# Patient Record
Sex: FEMALE | Race: WHITE | Employment: OTHER | URBAN - METROPOLITAN AREA
[De-identification: names, ages, dates, MRNs, and addresses within clinical notes are randomized per-mention and may not be internally consistent; named-entity substitution may affect disease eponyms.]

---

## 2017-01-20 DIAGNOSIS — E11.9 DIABETES MELLITUS, TYPE 2 (H): Primary | ICD-10-CM

## 2017-04-21 DIAGNOSIS — E11.9 TYPE 2 DIABETES MELLITUS WITHOUT COMPLICATION (H): ICD-10-CM

## 2017-04-21 DIAGNOSIS — F33.1 MAJOR DEPRESSIVE DISORDER, RECURRENT EPISODE, MODERATE (H): ICD-10-CM

## 2017-04-24 RX ORDER — GLIPIZIDE 5 MG/1
TABLET, FILM COATED, EXTENDED RELEASE ORAL
Qty: 30 TABLET | Refills: 0 | Status: SHIPPED | OUTPATIENT
Start: 2017-04-24 | End: 2017-05-08

## 2017-05-04 ENCOUNTER — OFFICE VISIT (OUTPATIENT)
Dept: FAMILY MEDICINE | Facility: OTHER | Age: 67
End: 2017-05-04
Attending: FAMILY MEDICINE
Payer: COMMERCIAL

## 2017-05-04 VITALS
WEIGHT: 185 LBS | BODY MASS INDEX: 31.76 KG/M2 | RESPIRATION RATE: 14 BRPM | SYSTOLIC BLOOD PRESSURE: 100 MMHG | HEART RATE: 80 BPM | DIASTOLIC BLOOD PRESSURE: 70 MMHG

## 2017-05-04 DIAGNOSIS — I10 ESSENTIAL HYPERTENSION: ICD-10-CM

## 2017-05-04 DIAGNOSIS — D64.9 ANEMIA, UNSPECIFIED TYPE: ICD-10-CM

## 2017-05-04 DIAGNOSIS — D33.3 BENIGN NEOPLASM OF CRANIAL NERVE (H): ICD-10-CM

## 2017-05-04 DIAGNOSIS — Z11.59 NEED FOR HEPATITIS C SCREENING TEST: ICD-10-CM

## 2017-05-04 DIAGNOSIS — E11.9 TYPE 2 DIABETES MELLITUS WITHOUT COMPLICATION, WITHOUT LONG-TERM CURRENT USE OF INSULIN (H): Primary | ICD-10-CM

## 2017-05-04 DIAGNOSIS — M54.41 CHRONIC BILATERAL LOW BACK PAIN WITH RIGHT-SIDED SCIATICA: ICD-10-CM

## 2017-05-04 DIAGNOSIS — M25.562 CHRONIC PAIN OF LEFT KNEE: ICD-10-CM

## 2017-05-04 DIAGNOSIS — G89.29 CHRONIC BILATERAL LOW BACK PAIN WITH RIGHT-SIDED SCIATICA: ICD-10-CM

## 2017-05-04 DIAGNOSIS — R22.1 MASS OF RIGHT SIDE OF NECK: ICD-10-CM

## 2017-05-04 DIAGNOSIS — G89.29 CHRONIC PAIN OF LEFT KNEE: ICD-10-CM

## 2017-05-04 LAB
ERYTHROCYTE [DISTWIDTH] IN BLOOD BY AUTOMATED COUNT: 13.2 % (ref 10–15)
HCT VFR BLD AUTO: 44.2 % (ref 35–47)
HGB BLD-MCNC: 14.9 G/DL (ref 11.7–15.7)
MCH RBC QN AUTO: 28.6 PG (ref 26.5–33)
MCHC RBC AUTO-ENTMCNC: 33.7 G/DL (ref 31.5–36.5)
MCV RBC AUTO: 85 FL (ref 78–100)
PLATELET # BLD AUTO: 244 10E9/L (ref 150–450)
RBC # BLD AUTO: 5.21 10E12/L (ref 3.8–5.2)
WBC # BLD AUTO: 9.5 10E9/L (ref 4–11)

## 2017-05-04 PROCEDURE — 84443 ASSAY THYROID STIM HORMONE: CPT | Mod: ZL | Performed by: FAMILY MEDICINE

## 2017-05-04 PROCEDURE — 99000 SPECIMEN HANDLING OFFICE-LAB: CPT | Performed by: FAMILY MEDICINE

## 2017-05-04 PROCEDURE — 83036 HEMOGLOBIN GLYCOSYLATED A1C: CPT | Mod: ZL | Performed by: FAMILY MEDICINE

## 2017-05-04 PROCEDURE — 99215 OFFICE O/P EST HI 40 MIN: CPT | Performed by: FAMILY MEDICINE

## 2017-05-04 PROCEDURE — 80061 LIPID PANEL: CPT | Mod: ZL | Performed by: FAMILY MEDICINE

## 2017-05-04 PROCEDURE — 86803 HEPATITIS C AB TEST: CPT | Mod: ZL | Performed by: FAMILY MEDICINE

## 2017-05-04 PROCEDURE — 36415 COLL VENOUS BLD VENIPUNCTURE: CPT | Mod: ZL | Performed by: FAMILY MEDICINE

## 2017-05-04 PROCEDURE — 85027 COMPLETE CBC AUTOMATED: CPT | Mod: ZL | Performed by: FAMILY MEDICINE

## 2017-05-04 PROCEDURE — 40000788 ZZHCL STATISTIC ESTIMATED AVERAGE GLUCOSE: Mod: ZL | Performed by: FAMILY MEDICINE

## 2017-05-04 PROCEDURE — 80048 BASIC METABOLIC PNL TOTAL CA: CPT | Mod: ZL | Performed by: FAMILY MEDICINE

## 2017-05-04 PROCEDURE — 99212 OFFICE O/P EST SF 10 MIN: CPT

## 2017-05-04 RX ORDER — CYCLOSPORINE 0.5 MG/ML
1 EMULSION OPHTHALMIC 2 TIMES DAILY
COMMUNITY
End: 2020-01-01

## 2017-05-04 NOTE — PROGRESS NOTES
SUBJECTIVE:                                                    Nilda Ramirez is a 66 year old female who presents to clinic today for the following health issues:      Diabetes Follow-up      Patient is checking blood sugars: rarely.     Diabetic concerns: None     Symptoms of hypoglycemia (low blood sugar): shaky, dizzy     Paresthesias (numbness or burning in feet) or sores: No     Date of last diabetic eye exam: 1/2015     Hypertension Follow-up      Outpatient blood pressures are not being checked.    Low Salt Diet: no added salt     Back Pain Follow Up       Description:   Location of pain:  bilateral  Character of pain: dull ache, stabbing and intermittent  Pain radiation: radiates into the right buttocks  Since last visit, pain is:  unchanged  New numbness or weakness in legs, not attributed to pain:  no     Intensity: moderate    History:   Pain interferes with job: Not applicable  Therapies tried without relief: medications and therapy  Therapies tried with relief: none        Accompanying Signs & Symptoms:  Risk of Fracture:  Age >64  Risk of Cauda Equina:  None  Risk of Infection:  None  Risk of Cancer:  None    Patient states she was to be [facility] to [facility] after her last appointment and nothing happened.  She wonders if repeat MRI is needed.     Patient does have a history of resection of a brain tumor.  Her last MRI was in 2015.  Regular follow-up was recommended.    Patient states she has found a mass along the right jaw line.  She states if has been present for a few weeks.  There are some days where she can't feel it.  She is concerned about a malignant process.    Patient states she has had both knees replaced, but her left knee has been bothering her, which is causing pain in the left hip area as well.  She wonders if she can have MRI of both areas, as we are already ordering MRI of brain and lumbar spine.    Patient is well overdue for many screening issues, and does agree to  come back in a couple of weeks to address further concerns.      Problem list and histories reviewed & adjusted, as indicated.  Additional history: as documented    Patient Active Problem List   Diagnosis     Advanced care planning/counseling discussion     Diabetes mellitus, type 2 (H)     Benign neoplasm of cranial nerve (H)     Schwannoma of cranial nerve (H)     Closed fracture of cervical vertebra (H)     Anemia     Anxiety state     Arthropathy     History of pulmonary embolism     Major depressive disorder, recurrent episode (H)     Esophageal reflux     Essential hypertension     History of disease of skin and subcutaneous tissue     Insomnia     Past Surgical History:   Procedure Laterality Date     ARTHROSCOPY KNEE  1996    bilat total knees     BACK SURGERY  2006     BIOPSY BREAST  2009     BIOPSY BREAST  2010     BRAIN TUMOR RESECTION  2000    Acoustic neuroma/schwanoma     COLONOSCOPY  2007    repeat 10 yrs     D&C  1990     deafness in left ear       knee replacement  2/2013    right     Left total knee replacement  11/19/2012    left - Degenerative disc disease     Multi-level cervical spine fusion      Cervical spine multi-level fracture     Right knee cortisone injection  11/26/2012     total knee arthroplasty  2/15/2013    right - knee arthrosis, severe valgus       Social History   Substance Use Topics     Smoking status: Never Smoker     Smokeless tobacco: Never Used      Comment: no passive smoke exposure     Alcohol use Yes      Comment: beer & wine rarely     Family History   Problem Relation Age of Onset     CEREBROVASCULAR DISEASE Paternal Grandmother      CVA     HEART DISEASE Father      heart disease     Coronary Artery Disease Father      Thyroid Disease No family hx of      Asthma No family hx of          Current Outpatient Prescriptions   Medication Sig Dispense Refill     cycloSPORINE (RESTASIS) 0.05 % ophthalmic emulsion Place 1 drop into both eyes 2 times daily       sertraline  "(ZOLOFT) 50 MG tablet TAKE 1 TABLET (50 MG) BY MOUTH DAILY 30 tablet 0     glipiZIDE (GLUCOTROL XL) 5 MG 24 hr tablet TAKE 1 TABLET (5 MG) BY MOUTH DAILY 30 tablet 0     ANDERSON CONTOUR NEXT test strip USE TO TEST BLOOD SUGAR ONCE DAILY OR AS DIRECTED. 50 each 0     ALLERGY RELIEF 10 MG tablet TAKE 1 TABLET (10 MG) BY MOUTH DAILY AS NEEDED 30 tablet 0     EASY TOUCH LANCETS 32G/TWIST MISC AS DIRECTED 100 each 2     traZODone (DESYREL) 100 MG tablet Take 1 tablet (100 mg) by mouth At Bedtime 90 tablet 3     omeprazole (PRILOSEC) 20 MG capsule Take 1 capsule (20 mg) by mouth daily as needed 90 capsule 3     hydrochlorothiazide (HYDRODIURIL) 25 MG tablet Take 1 tablet (25 mg) by mouth daily as needed 90 tablet 3     oxyCODONE-acetaminophen (PERCOCET) 5-325 MG per tablet Take 1 tablet by mouth nightly as needed for moderate to severe pain 30 tablet 0     loratadine (CLARITIN) 10 MG tablet Take 1 tablet (10 mg) by mouth daily as needed 90 tablet 3     ASPIRIN EC PO Take 162 mg by mouth daily       acetaminophen (TYLENOL) 325 MG tablet Take 1-2 tablets by mouth every 4 hours as needed.       Allergies   Allergen Reactions     Hydrocodone Bitartrate      Meperidine Hcl      Morphine Sulfate        ROS:  Constitutional, HEENT, cardiovascular, pulmonary, GI, , musculoskeletal, neuro, skin, endocrine and psych systems are negative, except as otherwise noted.  Patient does note that her \"gallstone\" is giving her trouble, and states she will need her gallbladder removed at some point here.    OBJECTIVE:                                                    /70 (BP Location: Left arm, Patient Position: Chair, Cuff Size: Adult Large)  Pulse 80  Resp 14  Wt 185 lb (83.9 kg)  BMI 31.76 kg/m2  Body mass index is 31.76 kg/(m^2).  GENERAL: healthy, alert and no distress  Neck: soft mass, likely lymph node, along right jaw line  PSYCH: mentation appears normal, affect normal/bright    Diagnostic Test Results:  none  "     ASSESSMENT/PLAN:                                                    Diabetes Type II, A1c Controlled, non-insulin dependent   Associated with the following complications    Renal Complications:  None    Ophthalmologic Complications: None    Neurologic Complications: None    Peripheral Vascular Complications:  None    Other: None   Plan:  No changes in the patient's current treatment plan  Labs:  See orders    Hypertension; controlled   Associated with the following complications:    Diabetes   Plan:  Labs:   See orders            ICD-10-CM    1. Type 2 diabetes mellitus without complication, without long-term current use of insulin (H) E11.9 Hemoglobin A1c     Lipid Profile     TSH     Albumin Random Urine Quantitative     CANCELED: Albumin Random Urine Quantitative   2. Essential hypertension I10 Basic metabolic panel     Albumin Random Urine Quantitative   3. Anemia, unspecified type D64.9 CBC with platelets   4. Benign neoplasm of cranial nerve (H) D33.3 MR Brain w/o & w Contrast     MR Brain w/o & w Contrast   5. Chronic bilateral low back pain with right-sided sciatica M54.41 MR Lumbar Spine w/o Contrast    G89.29 MR Lumbar Spine w/o Contrast   6. Chronic pain of left knee M25.562 ORTHOPEDICS ADULT REFERRAL    G89.29     previous replacement   7. Mass of right side of neck R22.1 US Head Neck Soft Tissue     US Head Neck Soft Tissue   8. Need for hepatitis C screening test Z11.59 Hepatitis C Screen Reflex to HCV RNA Quant and Genotype       FUTURE APPOINTMENTS:       - Follow-up visit in 2 weeks to review results and address further concerns.    Over 50 minutes are spent with the patient, over 50% of which was in education and counseling regarding current conditions and treatment/therapy options/risks/benefits/etc.      Jennifer Bowling MD  [facility]

## 2017-05-04 NOTE — MR AVS SNAPSHOT
After Visit Summary   5/4/2017    Nilda Ramirez    MRN: [id number]           Patient Information     Date Of Birth          1950        Visit Information        Provider Department      5/4/2017 2:00 PM Jennifer Bowling MD [facility]        Today's Diagnoses     Type 2 diabetes mellitus without complication, without long-term current use of insulin (H)    -  1    Essential hypertension        Anemia, unspecified type        Benign neoplasm of cranial nerve (H)        Chronic bilateral low back pain with right-sided sciatica        Chronic pain of left knee        Mass of right side of neck        Need for hepatitis C screening test           Follow-ups after your visit        Additional Services     ORTHOPEDICS ADULT REFERRAL       Your provider has referred you to: Dr. Garcia locally    Please be aware that coverage of these services is subject to the terms and limitations of your health insurance plan.  Call member services at your health plan with any benefit or coverage questions.      Please bring the following to your appointment:    >>   Any x-rays, CTs or MRIs which have been performed.  Contact the facility where they were done to arrange for  prior to your scheduled appointment.    >>   List of current medications   >>   This referral request   >>   Any documents/labs given to you for this referral                  Follow-up notes from your care team     Return in about 2 weeks (around 5/18/2017).      Your next 10 appointments already scheduled     May 12, 2017 10:15 AM CDT   (Arrive by 10:00 AM)   New Visit with Oliver Garcia MD   [facility] ([facility] )    [address]  [facility] [address]   [phone]            May 16, 2017  2:00 PM CDT   Radiology with HI MRI   [facility] ([facility] )    [address] [place] [address] [id number] [phone]            May 16, 2017  3:30 PM CDT   Radiology  "with HI UNTRASOUND 1   HI Ultrasound ([facility] )    [address] [facility] [address]   [phone]            May 23, 2017  2:00 PM CDT   (Arrive by 1:45 PM)   SHORT with Jennifer Bowling MD   [facility] ([facility] )    [address]  [place] [address]   [phone]              Future tests that were ordered for you today     Open Future Orders        Priority Expected Expires Ordered    Albumin Random Urine Quantitative Routine  2018            Who to contact     If you have questions or need follow up information about today's clinic visit or your schedule please contact [facility] directly at [phone].  Normal or non-critical lab and imaging results will be communicated to you by SoothEasehart, letter or phone within 4 business days after the clinic has received the results. If you do not hear from us within 7 days, please contact the clinic through SoothEasehart or phone. If you have a critical or abnormal lab result, we will notify you by phone as soon as possible.  Submit refill requests through [employer] or call your pharmacy and they will forward the refill request to us. Please allow 3 business days for your refill to be completed.          Additional Information About Your Visit        [employer] Information     [employer] lets you send messages to your doctor, view your test results, renew your prescriptions, schedule appointments and more. To sign up, go to [email or web address] . Click on \"Log in\" on the left side of the screen, which will take you to the Welcome page. Then click on \"Sign up Now\" on the right side of the page.     You will be asked to enter the access code listed below, as well as some personal information. Please follow the directions to create your username and password.     Your access code is: [email or web address]  Expires: 2017  5:45 PM     Your access code will  in 90 days. If you need help or a new " code, please call your [facility] clinic or [phone].        Care EveryWhere ID     This is your Care EveryWhere ID. This could be used by other organizations to access your [facility] medical records  [id number]        Your Vitals Were     Pulse Respirations BMI (Body Mass Index)             80 14 31.76 kg/m2          Blood Pressure from Last 3 Encounters:   05/04/17 100/70   10/09/15 100/64   02/10/15 120/76    Weight from Last 3 Encounters:   05/04/17 185 lb (83.9 kg)   10/09/15 152 lb (68.9 kg)   02/10/15 163 lb (73.9 kg)              We Performed the Following     Basic metabolic panel     CBC with platelets     Hemoglobin A1c     Hepatitis C Screen Reflex to HCV RNA Quant and Genotype     Lipid Profile     MR Brain w/o & w Contrast     MR Lumbar Spine w/o Contrast     ORTHOPEDICS ADULT REFERRAL     [facility] Head Neck Soft Tissue        Primary Care Provider Office Phone # Fax #    Jennifer Bowling [phone]       [address] [address]        Thank you!     Thank you for choosing [facility]  for your care. Our goal is always to provide you with excellent care. Hearing back from our patients is one way we can continue to improve our services. Please take a few minutes to complete the written survey that you may receive in the mail after your visit with us. Thank you!             Your Updated Medication List - Protect others around you: Learn how to safely use, store and throw away your medicines at www.disposemymeds.org.          This list is accurate as of: 5/4/17  5:45 PM.  Always use your most recent med list.                   Brand Name Dispense Instructions for use    ASPIRIN EC PO      Take 162 mg by mouth daily       ANDERSON CONTOUR NEXT test strip   Generic drug:  blood glucose monitoring     50 each    USE TO TEST BLOOD SUGAR ONCE DAILY OR AS DIRECTED.       EASY TOUCH LANCETS 32G/TWIST Misc     100 each    AS  DIRECTED       glipiZIDE 5 MG 24 hr tablet    GLUCOTROL XL    30 tablet    TAKE 1 TABLET (5 MG) BY MOUTH DAILY       hydrochlorothiazide 25 MG tablet    HYDRODIURIL    90 tablet    Take 1 tablet (25 mg) by mouth daily as needed       * loratadine 10 MG tablet    CLARITIN    90 tablet    Take 1 tablet (10 mg) by mouth daily as needed       * ALLERGY RELIEF 10 MG tablet   Generic drug:  loratadine     30 tablet    TAKE 1 TABLET (10 MG) BY MOUTH DAILY AS NEEDED       omeprazole 20 MG CR capsule    priLOSEC    90 capsule    Take 1 capsule (20 mg) by mouth daily as needed       oxyCODONE-acetaminophen 5-325 MG per tablet    PERCOCET    30 tablet    Take 1 tablet by mouth nightly as needed for moderate to severe pain       RESTASIS 0.05 % ophthalmic emulsion   Generic drug:  cycloSPORINE      Place 1 drop into both eyes 2 times daily       sertraline 50 MG tablet    ZOLOFT    30 tablet    TAKE 1 TABLET (50 MG) BY MOUTH DAILY       traZODone 100 MG tablet    DESYREL    90 tablet    Take 1 tablet (100 mg) by mouth At Bedtime       TYLENOL 325 MG tablet   Generic drug:  acetaminophen      Take 1-2 tablets by mouth every 4 hours as needed.       * Notice:  This list has 2 medication(s) that are the same as other medications prescribed for you. Read the directions carefully, and ask your doctor or other care provider to review them with you.

## 2017-05-04 NOTE — NURSING NOTE
"Chief Complaint   Patient presents with     Chronic Disease Management     last seen , gained 35lbs over the last yr     Musculoskeletal Problem     bilat total knees, now left is hurting     Mass     right side of neck     Urinary Problem     leakage, would like rx for pads       Initial /70 (BP Location: Left arm, Patient Position: Chair, Cuff Size: Adult Large)  Pulse 80  Resp 14  Wt 185 lb (83.9 kg)  BMI 31.76 kg/m2 Estimated body mass index is 31.76 kg/(m^2) as calculated from the following:    Height as of 10/9/15: 5' 4\" (1.626 m).    Weight as of this encounter: 185 lb (83.9 kg).  Medication Reconciliation: complete     Melisa Kumar      "

## 2017-05-05 ENCOUNTER — TELEPHONE (OUTPATIENT)
Dept: FAMILY MEDICINE | Facility: OTHER | Age: 67
End: 2017-05-05

## 2017-05-05 DIAGNOSIS — I10 ESSENTIAL HYPERTENSION: ICD-10-CM

## 2017-05-05 DIAGNOSIS — E11.9 TYPE 2 DIABETES MELLITUS WITHOUT COMPLICATION, WITHOUT LONG-TERM CURRENT USE OF INSULIN (H): ICD-10-CM

## 2017-05-05 DIAGNOSIS — R32 URINARY INCONTINENCE, UNSPECIFIED TYPE: ICD-10-CM

## 2017-05-05 DIAGNOSIS — F33.1 MAJOR DEPRESSIVE DISORDER, RECURRENT EPISODE, MODERATE (H): ICD-10-CM

## 2017-05-05 DIAGNOSIS — E11.9 TYPE 2 DIABETES MELLITUS WITHOUT COMPLICATION, WITHOUT LONG-TERM CURRENT USE OF INSULIN (H): Primary | ICD-10-CM

## 2017-05-05 LAB
ANION GAP SERPL CALCULATED.3IONS-SCNC: 15 MMOL/L (ref 3–14)
BUN SERPL-MCNC: 15 MG/DL (ref 7–30)
CALCIUM SERPL-MCNC: 9.3 MG/DL (ref 8.5–10.1)
CHLORIDE SERPL-SCNC: 102 MMOL/L (ref 94–109)
CHOLEST SERPL-MCNC: 157 MG/DL
CO2 SERPL-SCNC: 25 MMOL/L (ref 20–32)
CREAT SERPL-MCNC: 1.14 MG/DL (ref 0.52–1.04)
EST. AVERAGE GLUCOSE BLD GHB EST-MCNC: 134 MG/DL
GFR SERPL CREATININE-BSD FRML MDRD: 47 ML/MIN/1.7M2
GLUCOSE SERPL-MCNC: 129 MG/DL (ref 70–99)
HBA1C MFR BLD: 6.3 % (ref 4.3–6)
HDLC SERPL-MCNC: 54 MG/DL
LDLC SERPL CALC-MCNC: 83 MG/DL
NONHDLC SERPL-MCNC: 103 MG/DL
POTASSIUM SERPL-SCNC: 4 MMOL/L (ref 3.4–5.3)
SODIUM SERPL-SCNC: 142 MMOL/L (ref 133–144)
TRIGL SERPL-MCNC: 102 MG/DL
TSH SERPL DL<=0.05 MIU/L-ACNC: 1.72 MU/L (ref 0.4–4)

## 2017-05-05 PROCEDURE — 82043 UR ALBUMIN QUANTITATIVE: CPT | Mod: ZL | Performed by: FAMILY MEDICINE

## 2017-05-05 ASSESSMENT — PATIENT HEALTH QUESTIONNAIRE - PHQ9: SUM OF ALL RESPONSES TO PHQ QUESTIONS 1-9: 6

## 2017-05-05 NOTE — TELEPHONE ENCOUNTER
4:43 PM    Reason for Call: Phone Call    Description: NEED FAX SENT ON MEDS & INCONTINENCE SUPPLIES ORDERED    Was an appointment offered for this call? N/A    Preferred method for responding to this message: Telephone Call    If we cannot reach you directly, may we leave a detailed response at the number you provided? Yes    Can this message wait until your PCP/provider returns, if available today? Not applicable, PROVIDER IN TODAY    Rosalinda Best

## 2017-05-08 DIAGNOSIS — E11.9 DIABETES MELLITUS, TYPE 2 (H): ICD-10-CM

## 2017-05-08 LAB
CREAT UR-MCNC: 249 MG/DL
HCV AB SERPL QL IA: NORMAL
MICROALBUMIN UR-MCNC: 9 MG/L
MICROALBUMIN/CREAT UR: 3.78 MG/G CR (ref 0–25)

## 2017-05-08 RX ORDER — GLIPIZIDE 5 MG/1
5 TABLET, FILM COATED, EXTENDED RELEASE ORAL DAILY
Qty: 90 TABLET | Refills: 3 | Status: SHIPPED | OUTPATIENT
Start: 2017-05-08 | End: 2018-02-21

## 2017-05-08 RX ORDER — HYDROCHLOROTHIAZIDE 25 MG/1
25 TABLET ORAL DAILY PRN
Qty: 90 TABLET | Refills: 3 | Status: SHIPPED | OUTPATIENT
Start: 2017-05-08 | End: 2019-07-09

## 2017-05-08 NOTE — TELEPHONE ENCOUNTER
Glipizide, HCTZ, and sertraline sent to [facility] (looks like those are the main active prescriptions).  Script printed for incontinence supplies.

## 2017-05-08 NOTE — TELEPHONE ENCOUNTER
HAS INCONTINENT CONCERN ABOUT 3 PAIR DAILY AND WOULD LIKE TO A CTIVE STYLE SHE WILL CONTACT THEM TO SEND US THE FAX AND THEN SHE STATES SHE HAS NOT RECEIVED ANY OF HER MEDICATION REFILLS FROM APPT ON Thursday TO [facility] AND THEY ARE STILL WAITING

## 2017-05-10 NOTE — TELEPHONE ENCOUNTER
Contour Test Strips         Last Written Prescription Date: 1/31/2017  Last Fill Quantity: 50, # refills: 0  Last Office Visit with [facility], [facility] or  Health prescribing provider:  05/04/2017   Next 5 appointments (look out 90 days)     May 23, 2017  2:00 PM CDT   (Arrive by 1:45 PM)   SHORT with Jennifer Bowling MD   [facility] ([facility] )    [address]  [facility] [address]   [phone]                   BP Readings from Last 3 Encounters:   05/04/17 100/70   10/09/15 100/64   02/10/15 120/76     Lab Results   Component Value Date    MICROL 9 05/05/2017     Lab Results   Component Value Date    UMALCR 3.78 05/05/2017     Creatinine   Date Value Ref Range Status   05/04/2017 1.14 (H) 0.52 - 1.04 mg/dL Final   ]  GFR Estimate   Date Value Ref Range Status   05/04/2017 47 (L) >60 mL/min/1.7m2 Final     Comment:     Non  GFR Calc   10/09/2015 57 (L) >60 mL/min/1.7m2 Final     Comment:     Non  GFR Calc   12/29/2014 54 (L) >60 mL/min/1.7m2 Final     Comment:     Non  GFR Calc     GFR Estimate If Black   Date Value Ref Range Status   05/04/2017 57 (L) >60 mL/min/1.7m2 Final     Comment:      GFR Calc   10/09/2015 69 >60 mL/min/1.7m2 Final     Comment:      GFR Calc   12/29/2014 65 >60 mL/min/1.7m2 Final     Comment:      GFR Calc     Lab Results   Component Value Date    CHOL 157 05/04/2017     Lab Results   Component Value Date    HDL 54 05/04/2017     Lab Results   Component Value Date    LDL 83 05/04/2017     Lab Results   Component Value Date    TRIG 102 05/04/2017     Lab Results   Component Value Date    CHOLHDLRATIO 2.4 10/09/2015     No results found for: AST  No results found for: ALT  Lab Results   Component Value Date    A1C 6.3 05/04/2017    A1C 5.7 10/09/2015    A1C 5.6 12/29/2014    A1C 7.5 05/04/2012     Potassium   Date Value Ref Range Status   05/04/2017 4.0 3.4 - 5.3  mmol/L Final

## 2017-05-12 ENCOUNTER — OFFICE VISIT (OUTPATIENT)
Dept: CARE COORDINATION | Facility: OTHER | Age: 67
End: 2017-05-12
Attending: FAMILY MEDICINE
Payer: COMMERCIAL

## 2017-05-12 DIAGNOSIS — M25.511 CHRONIC RIGHT SHOULDER PAIN: ICD-10-CM

## 2017-05-12 DIAGNOSIS — G89.29 CHRONIC RIGHT SHOULDER PAIN: ICD-10-CM

## 2017-05-12 DIAGNOSIS — M25.562 LEFT KNEE PAIN: Primary | ICD-10-CM

## 2017-05-12 DIAGNOSIS — M25.552 HIP PAIN, LEFT: ICD-10-CM

## 2017-05-12 PROCEDURE — 73030 X-RAY EXAM OF SHOULDER: CPT | Mod: TC,RT

## 2017-05-12 PROCEDURE — 73502 X-RAY EXAM HIP UNI 2-3 VIEWS: CPT | Mod: TC,LT

## 2017-05-16 ENCOUNTER — TELEPHONE (OUTPATIENT)
Dept: MRI IMAGING | Facility: HOSPITAL | Age: 67
End: 2017-05-16

## 2017-05-17 DIAGNOSIS — J30.9 ALLERGIC RHINITIS: ICD-10-CM

## 2017-05-17 DIAGNOSIS — F33.9 MAJOR DEPRESSIVE DISORDER, RECURRENT EPISODE (H): ICD-10-CM

## 2017-05-18 DIAGNOSIS — G47.00 INSOMNIA: ICD-10-CM

## 2017-05-18 NOTE — TELEPHONE ENCOUNTER
trazadone      Last Written Prescription Date: 11/3/16  Last Fill Quantity: 90,  # refills: 3   Last Office Visit with [facility] or  Health prescribing provider: 5/4/17                                         Next 5 appointments (look out 90 days)     May 23, 2017  2:00 PM CDT   (Arrive by 1:45 PM)   SHORT with Jennifer Bowling MD   [facility] ([facility] )    [address]  [place] [address]   [phone]               claritin      Last Written Prescription Date: 2/15/13  Last Fill Quantity: 90,  # refills: 3   Last Office Visit with [facility] or [facility] prescribing provider: 5/4/17                                         Next 5 appointments (look out 90 days)     May 23, 2017  2:00 PM CDT   (Arrive by 1:45 PM)   SHORT with Jennifer Bowling MD   [facility] ([facility] )    [address]  [place] [address]   [phone]

## 2017-05-19 RX ORDER — TRAZODONE HYDROCHLORIDE 100 MG/1
TABLET ORAL
Qty: 90 TABLET | Refills: 1 | Status: SHIPPED | OUTPATIENT
Start: 2017-05-19 | End: 2017-06-20

## 2017-05-19 RX ORDER — LORATADINE 10 MG/1
TABLET ORAL
Qty: 90 TABLET | Refills: 3 | Status: SHIPPED | OUTPATIENT
Start: 2017-05-19 | End: 2018-05-21

## 2017-05-19 RX ORDER — TRAZODONE HYDROCHLORIDE 100 MG/1
TABLET ORAL
Qty: 90 TABLET | Refills: 1 | Status: SHIPPED | OUTPATIENT
Start: 2017-05-19 | End: 2017-10-20

## 2017-05-23 ENCOUNTER — OFFICE VISIT (OUTPATIENT)
Dept: FAMILY MEDICINE | Facility: OTHER | Age: 67
End: 2017-05-23
Attending: FAMILY MEDICINE
Payer: COMMERCIAL

## 2017-05-23 VITALS
OXYGEN SATURATION: 98 % | DIASTOLIC BLOOD PRESSURE: 86 MMHG | TEMPERATURE: 97.2 F | WEIGHT: 177 LBS | HEART RATE: 81 BPM | SYSTOLIC BLOOD PRESSURE: 122 MMHG | HEIGHT: 65 IN | BODY MASS INDEX: 29.49 KG/M2 | RESPIRATION RATE: 15 BRPM

## 2017-05-23 DIAGNOSIS — R10.11 RUQ ABDOMINAL PAIN: Primary | ICD-10-CM

## 2017-05-23 DIAGNOSIS — M54.41 CHRONIC BILATERAL LOW BACK PAIN WITH RIGHT-SIDED SCIATICA: ICD-10-CM

## 2017-05-23 DIAGNOSIS — G89.29 CHRONIC BILATERAL LOW BACK PAIN WITH RIGHT-SIDED SCIATICA: ICD-10-CM

## 2017-05-23 PROCEDURE — 99214 OFFICE O/P EST MOD 30 MIN: CPT | Performed by: FAMILY MEDICINE

## 2017-05-23 PROCEDURE — 99212 OFFICE O/P EST SF 10 MIN: CPT

## 2017-05-23 NOTE — MR AVS SNAPSHOT
"              After Visit Summary   2017    Nilda Ramirez    MRN: [id number]           Patient Information     Date Of Birth          1950        Visit Information        Provider Department      2017 2:00 PM Jennifer Bowling MD [facility]        Today's Diagnoses     RUQ abdominal pain    -  1    Chronic bilateral low back pain with right-sided sciatica           Follow-ups after your visit        Follow-up notes from your care team     Return if symptoms worsen or fail to improve.      Who to contact     If you have questions or need follow up information about today's clinic visit or your schedule please contact [facility] directly at [phone].  Normal or non-critical lab and imaging results will be communicated to you by MyChart, letter or phone within 4 business days after the clinic has received the results. If you do not hear from us within 7 days, please contact the clinic through MyChart or phone. If you have a critical or abnormal lab result, we will notify you by phone as soon as possible.  Submit refill requests through [employer] or call your pharmacy and they will forward the refill request to us. Please allow 3 business days for your refill to be completed.          Additional Information About Your Visit        MyChart Information     [employer] lets you send messages to your doctor, view your test results, renew your prescriptions, schedule appointments and more. To sign up, go to [email or web address] . Click on \"Log in\" on the left side of the screen, which will take you to the Welcome page. Then click on \"Sign up Now\" on the right side of the page.     You will be asked to enter the access code listed below, as well as some personal information. Please follow the directions to create your username and password.     Your access code is: [email or web address]  Expires: 2017  5:45 PM     Your access code will  in 90 days. If you need help or a new " "code, please call your [facility] or [phone].        Care EveryWhere ID     This is your Care EveryWhere ID. This could be used by other organizations to access your [facility] medical records  [id number]        Your Vitals Were     Pulse Temperature Respirations Height Pulse Oximetry BMI (Body Mass Index)    81 97.2  F (36.2  C) (Tympanic) 15 5' 4.5\" (1.638 m) 98% 29.91 kg/m2       Blood Pressure from Last 3 Encounters:   05/23/17 122/86   05/04/17 100/70   10/09/15 100/64    Weight from Last 3 Encounters:   05/23/17 177 lb (80.3 kg)   05/04/17 185 lb (83.9 kg)   10/09/15 152 lb (68.9 kg)              Today, you had the following     No orders found for display         Today's Medication Changes          These changes are accurate as of: 5/23/17 11:59 PM.  If you have any questions, ask your nurse or doctor.               These medicines have changed or have updated prescriptions.        Dose/Directions    ALLERGY RELIEF 10 MG tablet   This may have changed:  Another medication with the same name was removed. Continue taking this medication, and follow the directions you see here.   Used for:  Allergic rhinitis   Generic drug:  loratadine   Changed by:  Jennifer Bowling MD        TAKE 1 TABLET (10 MG) BY MOUTH DAILY AS NEEDED   Quantity:  90 tablet   Refills:  3                Primary Care Provider Office Phone # Fax #    Jennifer Bowling [phone]       [address] [address]        Thank you!     Thank you for choosing [facility]  for your care. Our goal is always to provide you with excellent care. Hearing back from our patients is one way we can continue to improve our services. Please take a few minutes to complete the written survey that you may receive in the mail after your visit with us. Thank you!             Your Updated Medication List - Protect others around you: Learn how to safely use, store and throw " away your medicines at www.disposemymeds.org.          This list is accurate as of: 5/23/17 11:59 PM.  Always use your most recent med list.                   Brand Name Dispense Instructions for use    ALLERGY RELIEF 10 MG tablet   Generic drug:  loratadine     90 tablet    TAKE 1 TABLET (10 MG) BY MOUTH DAILY AS NEEDED       ASPIRIN EC PO      Take 162 mg by mouth daily       ANDERSON CONTOUR NEXT test strip   Generic drug:  blood glucose monitoring     50 each    USE TO TEST BLOOD SUGAR ONCE DAILY OR AS DIRECTED.       EASY TOUCH LANCETS 32G/TWIST Misc     100 each    AS DIRECTED       glipiZIDE 5 MG 24 hr tablet    GLUCOTROL XL    90 tablet    Take 1 tablet (5 mg) by mouth daily       hydrochlorothiazide 25 MG tablet    HYDRODIURIL    90 tablet    Take 1 tablet (25 mg) by mouth daily as needed       omeprazole 20 MG CR capsule    priLOSEC    90 capsule    Take 1 capsule (20 mg) by mouth daily as needed       order for DME     100 Units    Incontinence pads, active style, up to 3 daily       oxyCODONE-acetaminophen 5-325 MG per tablet    PERCOCET    30 tablet    Take 1 tablet by mouth nightly as needed for moderate to severe pain       RESTASIS 0.05 % ophthalmic emulsion   Generic drug:  cycloSPORINE      Place 1 drop into both eyes 2 times daily       sertraline 50 MG tablet    ZOLOFT    90 tablet    Take 1 tablet (50 mg) by mouth daily       * traZODone 100 MG tablet    DESYREL    90 tablet    TAKE 1 TABLET (100 MG) BY MOUTH AT BEDTIME       * traZODone 100 MG tablet    DESYREL    90 tablet    TAKE 1 TABLET (100 MG) BY MOUTH AT BEDTIME       TYLENOL 325 MG tablet   Generic drug:  acetaminophen      Take 1-2 tablets by mouth every 4 hours as needed.       * Notice:  This list has 2 medication(s) that are the same as other medications prescribed for you. Read the directions carefully, and ask your doctor or other care provider to review them with you.

## 2017-05-26 ENCOUNTER — HOSPITAL ENCOUNTER (OUTPATIENT)
Dept: MRI IMAGING | Facility: HOSPITAL | Age: 67
End: 2017-05-26
Attending: FAMILY MEDICINE
Payer: COMMERCIAL

## 2017-05-26 ENCOUNTER — HOSPITAL ENCOUNTER (OUTPATIENT)
Dept: ULTRASOUND IMAGING | Facility: HOSPITAL | Age: 67
Discharge: HOME | End: 2017-05-26
Attending: FAMILY MEDICINE | Admitting: FAMILY MEDICINE
Payer: COMMERCIAL

## 2017-05-26 PROCEDURE — 72148 MRI LUMBAR SPINE W/O DYE: CPT | Mod: TC

## 2017-05-26 PROCEDURE — 70553 MRI BRAIN STEM W/O & W/DYE: CPT | Mod: TC

## 2017-05-26 PROCEDURE — 76705 ECHO EXAM OF ABDOMEN: CPT | Mod: TC

## 2017-05-26 PROCEDURE — A9585 GADOBUTROL INJECTION: HCPCS | Mod: TC

## 2017-05-26 PROCEDURE — 76536 US EXAM OF HEAD AND NECK: CPT | Mod: TC

## 2017-05-26 RX ORDER — OXYCODONE AND ACETAMINOPHEN 5; 325 MG/1; MG/1
1 TABLET ORAL
Qty: 30 TABLET | Refills: 0 | Status: ON HOLD | COMMUNITY
Start: 2017-05-23 | End: 2017-08-07

## 2017-05-26 RX ORDER — GADOBUTROL 604.72 MG/ML
7.5 INJECTION INTRAVENOUS ONCE
Status: COMPLETED | OUTPATIENT
Start: 2017-05-26 | End: 2017-05-26

## 2017-05-26 RX ADMIN — GADOBUTROL 7.5 ML: 604.72 INJECTION INTRAVENOUS at 13:27

## 2017-05-26 NOTE — PROGRESS NOTES
SUBJECTIVE:                                                    Nilda Ramirez is a 66 year old female who presents to clinic today for the following health issues:    Abdominal Pain      Duration: first started about 7 years ago, worse over the past year of so    Description (location/character/radiation): gallbladder pain, patient has been counseled to have gallbladder removed previously, but deferred       Associated flank pain: None    Intensity:  moderate    Accompanying signs and symptoms:        Fever/Chills: no        Gas/Bloating: no        Nausea/vomitting: no        Diarrhea: no        Dysuria or Hematuria: no     History (previous similar pain/trauma/previous testing): RUQ pain after eating    Precipitating or alleviating factors:       Pain worse with eating/BM/urination: yes       Pain relieved by BM: no     Therapies tried and outcome: None    LMP:  not applicable         Problem list and histories reviewed & adjusted, as indicated.  Additional history: as documented    Patient Active Problem List   Diagnosis     Advanced care planning/counseling discussion     Diabetes mellitus, type 2 (H)     Benign neoplasm of cranial nerve (H)     Schwannoma of cranial nerve (H)     Closed fracture of cervical vertebra (H)     Anemia     Anxiety state     Arthropathy     History of pulmonary embolism     Major depressive disorder, recurrent episode (H)     Esophageal reflux     Essential hypertension     History of disease of skin and subcutaneous tissue     Insomnia     Past Surgical History:   Procedure Laterality Date     ARTHROSCOPY KNEE  1996    bilat total knees     BACK SURGERY  2006     BIOPSY BREAST  2009     BIOPSY BREAST  2010     BRAIN TUMOR RESECTION  2000    Acoustic neuroma/schwanoma     COLONOSCOPY  2007    repeat 10 yrs     D&C  1990     deafness in left ear       knee replacement  2/2013    right     Left total knee replacement  11/19/2012    left - Degenerative disc disease     Multi-level  cervical spine fusion      Cervical spine multi-level fracture     Right knee cortisone injection  11/26/2012     total knee arthroplasty  2/15/2013    right - knee arthrosis, severe valgus       Social History   Substance Use Topics     Smoking status: Never Smoker     Smokeless tobacco: Never Used      Comment: no passive smoke exposure     Alcohol use Yes      Comment: beer & wine rarely     Family History   Problem Relation Age of Onset     CEREBROVASCULAR DISEASE Paternal Grandmother      CVA     HEART DISEASE Father      heart disease     Coronary Artery Disease Father      Thyroid Disease No family hx of      Asthma No family hx of          Current Outpatient Prescriptions   Medication Sig Dispense Refill     oxyCODONE-acetaminophen (PERCOCET) 5-325 MG per tablet Take 1 tablet by mouth nightly as needed for moderate to severe pain 30 tablet 0     traZODone (DESYREL) 100 MG tablet TAKE 1 TABLET (100 MG) BY MOUTH AT BEDTIME 90 tablet 1     ALLERGY RELIEF 10 MG tablet TAKE 1 TABLET (10 MG) BY MOUTH DAILY AS NEEDED 90 tablet 3     traZODone (DESYREL) 100 MG tablet TAKE 1 TABLET (100 MG) BY MOUTH AT BEDTIME 90 tablet 1     ANDERSON CONTOUR NEXT test strip USE TO TEST BLOOD SUGAR ONCE DAILY OR AS DIRECTED. 50 each 3     glipiZIDE (GLUCOTROL XL) 5 MG 24 hr tablet Take 1 tablet (5 mg) by mouth daily 90 tablet 3     hydrochlorothiazide (HYDRODIURIL) 25 MG tablet Take 1 tablet (25 mg) by mouth daily as needed 90 tablet 3     sertraline (ZOLOFT) 50 MG tablet Take 1 tablet (50 mg) by mouth daily 90 tablet 3     order for DME Incontinence pads, active style, up to 3 daily 100 Units prn     cycloSPORINE (RESTASIS) 0.05 % ophthalmic emulsion Place 1 drop into both eyes 2 times daily       EASY TOUCH LANCETS 32G/TWIST MISC AS DIRECTED 100 each 2     omeprazole (PRILOSEC) 20 MG capsule Take 1 capsule (20 mg) by mouth daily as needed 90 capsule 3     ASPIRIN EC PO Take 162 mg by mouth daily       acetaminophen (TYLENOL) 325 MG  "tablet Take 1-2 tablets by mouth every 4 hours as needed.       Allergies   Allergen Reactions     Hydrocodone Bitartrate      Meperidine Hcl      Morphine Sulfate        Reviewed and updated as needed this visit by clinical staff  Tobacco  Allergies  Meds  Problems  Med Hx  Surg Hx  Fam Hx  Soc Hx        Reviewed and updated as needed this visit by Provider         ROS:  Constitutional, HEENT, cardiovascular, pulmonary, gi and gu systems are negative, except as otherwise noted.    OBJECTIVE:                                                    /86 (BP Location: Left arm, Patient Position: Chair, Cuff Size: Adult Large)  Pulse 81  Temp 97.2  F (36.2  C) (Tympanic)  Resp 15  Ht 5' 4.5\" (1.638 m)  Wt 177 lb (80.3 kg)  SpO2 98%  BMI 29.91 kg/m2  Body mass index is 29.91 kg/(m^2).  GENERAL: healthy, alert and no distress  PSYCH: mentation appears normal, affect normal/bright    Diagnostic Test Results:  none      ASSESSMENT/PLAN:                                                      1. RUQ abdominal pain  Ultrasound ordered (on paper during Epic downtime).  Follow-up with results when available.    2. Chronic bilateral low back pain with right-sided sciatica  Renewed for intermittent nighttime use.  - oxyCODONE-acetaminophen (PERCOCET) 5-325 MG per tablet; Take 1 tablet by mouth nightly as needed for moderate to severe pain  Dispense: 30 tablet; Refill: 0    Over 25 minutes are spent with the patient, over 50% of which was in education and counseling regarding current conditions and treatment/therapy options/risks/benefits/etc.      Jennifer Bowling MD  [facility]    "

## 2017-05-31 DIAGNOSIS — M54.41 CHRONIC BILATERAL LOW BACK PAIN WITH RIGHT-SIDED SCIATICA: Primary | ICD-10-CM

## 2017-05-31 DIAGNOSIS — G89.29 CHRONIC BILATERAL LOW BACK PAIN WITH RIGHT-SIDED SCIATICA: Primary | ICD-10-CM

## 2017-05-31 DIAGNOSIS — K80.20 SYMPTOMATIC CHOLELITHIASIS: ICD-10-CM

## 2017-06-20 ENCOUNTER — OFFICE VISIT (OUTPATIENT)
Dept: SURGERY | Facility: OTHER | Age: 67
End: 2017-06-20
Attending: FAMILY MEDICINE
Payer: COMMERCIAL

## 2017-06-20 VITALS
BODY MASS INDEX: 28.32 KG/M2 | SYSTOLIC BLOOD PRESSURE: 94 MMHG | TEMPERATURE: 98.5 F | WEIGHT: 170 LBS | OXYGEN SATURATION: 95 % | HEIGHT: 65 IN | DIASTOLIC BLOOD PRESSURE: 60 MMHG | HEART RATE: 74 BPM

## 2017-06-20 DIAGNOSIS — Z86.018 HISTORY OF ACOUSTIC NEUROMA: Primary | ICD-10-CM

## 2017-06-20 DIAGNOSIS — K80.20 SYMPTOMATIC CHOLELITHIASIS: ICD-10-CM

## 2017-06-20 DIAGNOSIS — Z95.828 GREENFIELD FILTER IN PLACE: ICD-10-CM

## 2017-06-20 DIAGNOSIS — Z86.711 HISTORY OF PULMONARY EMBOLISM: ICD-10-CM

## 2017-06-20 PROCEDURE — 99205 OFFICE O/P NEW HI 60 MIN: CPT | Performed by: SURGERY

## 2017-06-20 PROCEDURE — 99213 OFFICE O/P EST LOW 20 MIN: CPT

## 2017-06-20 RX ORDER — CEFAZOLIN SODIUM 2 G/100ML
2 INJECTION, SOLUTION INTRAVENOUS
Status: CANCELLED | OUTPATIENT
Start: 2017-06-20

## 2017-06-20 ASSESSMENT — PAIN SCALES - GENERAL: PAINLEVEL: MILD PAIN (3)

## 2017-06-20 NOTE — NURSING NOTE
"Chief Complaint   Patient presents with     Consult     Symptomatic Cholelithiasis, CT complete 5/26/17.  Albino Biggs referring.       Initial BP 94/60 (BP Location: Right arm, Patient Position: Chair, Cuff Size: Adult Regular)  Pulse 74  Temp 98.5  F (36.9  C) (Tympanic)  Ht 5' 5\" (1.651 m)  Wt 170 lb (77.1 kg)  SpO2 95%  BMI 28.29 kg/m2 Estimated body mass index is 28.29 kg/(m^2) as calculated from the following:    Height as of this encounter: 5' 5\" (1.651 m).    Weight as of this encounter: 170 lb (77.1 kg).  Medication Reconciliation: complete   STEPHANIE VINSON      "

## 2017-06-20 NOTE — PATIENT INSTRUCTIONS
Thank you for allowing Dr. Pierre and our surgical team to participate in your care.  If you have a scheduling or an appointment question please contact our Health Unit Coordinator, [place],  at her direct line [phone].   ALL nursing questions or concerns can be directed to your surgical nurse at: [phone] -Alethea    You are scheduled for a: Laparoscopic possible open cholecystectomy  Your procedure date is:   Monday, August 7, 2017    Nothing to eat or drink after midnight, August 6, 2017        HOW TO PREPARE-      You need to have a scheduled Pre-Op with your primary care physician within 10 days of your scheduled surgery. Please call as soon as possible to schedule this.       You need a friend or family member available to drive you home AND stay with you for 24 hours after you leave the hospital. You will not be allowed to drive yourself. IF you need to take a taxi or the bus you MUST have a responsible person to ride with you. YOUR PROCEDURE WILL BE CANCELLED IF YOU DO NOT HAVE A RESPONSIBLE ADULT TO DRIVE YOU HOME.       You CANNOT have anything to eat or drink after midnight the night before your surgery, ncluding water and coffee. Your stomach needs to be completely empty. Do NOT chew gum, suck on hard candy, or smoke. You can brush your teeth the morning of surgery.       You need to call our Surgery Education Nurses 1-2 weeks prior to your surgery date at  [phone] or toll free [phone]. Please have you medication and allergy lists ready.      Stop your aspirin or other NSAIDs(Ibuprofen, Motrin, Aleve, Celebrex, Naproxen, etc...) 7 days before your surgery.      Hospital admitting will call you the day before your surgery with your arrival time. If you are scheduled on a Monday admitting will call you the Friday before.      Please call your primary care physician if you should become ill within 24 hours of scheduled surgery. (ex.vomiting, diarrhea, fever)          You will need to wash  the night before AND the morning of you procedure with the supplied Hibiclens. Wash your Surgical area with your bare hands, apply friction and rinse. KEEP IT AWAY FROM YOUR EYES, EARS, NOSE AND MOUTH.

## 2017-06-20 NOTE — MR AVS SNAPSHOT
After Visit Summary   6/20/2017    Nilda Ramirez    MRN: [id number]           Patient Information     Date Of Birth          1950        Visit Information        Provider Department      6/20/2017 2:30 PM Huma Pierre MD [facility]        Today's Diagnoses     History of acoustic neuroma    -  1    Symptomatic cholelithiasis        History of pulmonary embolism        [place] filter in place          Care Instructions    Thank you for allowing Dr. Pierre and our surgical team to participate in your care.  If you have a scheduling or an appointment question please contact our Health Unit Coordinator, [place],  at her direct line [phone].   ALL nursing questions or concerns can be directed to your surgical nurse at: [phone] -Alethea    You are scheduled for a: Laparoscopic possible open cholecystectomy  Your procedure date is:   Monday, August 7, 2017    Nothing to eat or drink after midnight, August 6, 2017        HOW TO PREPARE-      You need to have a scheduled Pre-Op with your primary care physician within 10 days of your scheduled surgery. Please call as soon as possible to schedule this.       You need a friend or family member available to drive you home AND stay with you for 24 hours after you leave the hospital. You will not be allowed to drive yourself. IF you need to take a taxi or the bus you MUST have a responsible person to ride with you. YOUR PROCEDURE WILL BE CANCELLED IF YOU DO NOT HAVE A RESPONSIBLE ADULT TO DRIVE YOU HOME.       You CANNOT have anything to eat or drink after midnight the night before your surgery, ncluding water and coffee. Your stomach needs to be completely empty. Do NOT chew gum, suck on hard candy, or smoke. You can brush your teeth the morning of surgery.       You need to call our Surgery Education Nurses 1-2 weeks prior to your surgery date at  [phone] or toll free [phone]. Please have you medication and allergy  "lists ready.      Stop your aspirin or other NSAIDs(Ibuprofen, Motrin, Aleve, Celebrex, Naproxen, etc...) 7 days before your surgery.      Hospital admitting will call you the day before your surgery with your arrival time. If you are scheduled on a Monday admitting will call you the Friday before.      Please call your primary care physician if you should become ill within 24 hours of scheduled surgery. (ex.vomiting, diarrhea, fever)          You will need to wash the night before AND the morning of you procedure with the supplied Hibiclens. Wash your Surgical area with your bare hands, apply friction and rinse. KEEP IT AWAY FROM YOUR EYES, EARS, NOSE AND MOUTH.             Follow-ups after your visit        Who to contact     If you have questions or need follow up information about today's clinic visit or your schedule please contact [facility] directly at [phone].  Normal or non-critical lab and imaging results will be communicated to you by MyChart, letter or phone within 4 business days after the clinic has received the results. If you do not hear from us within 7 days, please contact the clinic through [employer] or phone. If you have a critical or abnormal lab result, we will notify you by phone as soon as possible.  Submit refill requests through [employer] or call your pharmacy and they will forward the refill request to us. Please allow 3 business days for your refill to be completed.          Additional Information About Your Visit        [employer] Information     [employer] lets you send messages to your doctor, view your test results, renew your prescriptions, schedule appointments and more. To sign up, go to [email or web address] . Click on \"Log in\" on the left side of the screen, which will take you to the Welcome page. Then click on \"Sign up Now\" on the right side of the page.     You will be asked to enter the access code listed below, as well as some personal information. Please follow " "the directions to create your username and password.     Your access code is: [email or web address]  Expires: 2017  5:45 PM     Your access code will  in 90 days. If you need help or a new code, please call your [facility] clinic or [phone].        Care EveryWhere ID     This is your Care EveryWhere ID. This could be used by other organizations to access your [facility] medical records  [id number]        Your Vitals Were     Pulse Temperature Height Pulse Oximetry BMI (Body Mass Index)       74 98.5  F (36.9  C) (Tympanic) 5' 5\" (1.651 m) 95% 28.29 kg/m2        Blood Pressure from Last 3 Encounters:   17 94/60   17 122/86   17 100/70    Weight from Last 3 Encounters:   17 170 lb (77.1 kg)   17 177 lb (80.3 kg)   17 185 lb (83.9 kg)              Today, you had the following     No orders found for display       Primary Care Provider Office Phone # Fax #    Jennifer Bowling [phone]       [address] [address]        Thank you!     [facility] you [facility]  for your care. Our goal is always to provide you with excellent care. Hearing back from our patients is one way we can continue to improve our services. Please take a few minutes to complete the written survey that you may receive in the mail after your visit with us. Thank you!             Your Updated Medication List - Protect others around you: Learn how to safely use, store and throw away your medicines at www.disposemymeds.org.          This list is accurate as of: 17  2:53 PM.  Always use your most recent med list.                   Brand Name Dispense Instructions for use    ALLERGY RELIEF 10 MG tablet   Generic drug:  loratadine     90 tablet    TAKE 1 TABLET (10 MG) BY MOUTH DAILY AS NEEDED       ASPIRIN EC PO      Take 162 mg by mouth daily       ANDERSON CONTOUR NEXT test strip   Generic drug:  blood glucose monitoring "     50 each    USE TO TEST BLOOD SUGAR ONCE DAILY OR AS DIRECTED.       EASY TOUCH LANCETS 32G/TWIST Misc     100 each    AS DIRECTED       glipiZIDE 5 MG 24 hr tablet    GLUCOTROL XL    90 tablet    Take 1 tablet (5 mg) by mouth daily       hydrochlorothiazide 25 MG tablet    HYDRODIURIL    90 tablet    Take 1 tablet (25 mg) by mouth daily as needed       omeprazole 20 MG CR capsule    priLOSEC    90 capsule    Take 1 capsule (20 mg) by mouth daily as needed       order for DME     100 Units    Incontinence pads, active style, up to 3 daily       oxyCODONE-acetaminophen 5-325 MG per tablet    PERCOCET    30 tablet    Take 1 tablet by mouth nightly as needed for moderate to severe pain       RESTASIS 0.05 % ophthalmic emulsion   Generic drug:  cycloSPORINE      Place 1 drop into both eyes 2 times daily       sertraline 50 MG tablet    ZOLOFT    90 tablet    Take 1 tablet (50 mg) by mouth daily       traZODone 100 MG tablet    DESYREL    90 tablet    TAKE 1 TABLET (100 MG) BY MOUTH AT BEDTIME       TYLENOL 325 MG tablet   Generic drug:  acetaminophen      Take 1-2 tablets by mouth every 4 hours as needed.

## 2017-06-20 NOTE — PROGRESS NOTES
[facility] Surgery Consultation    CC:  Symptomatic cholelithiasis    HPI:  This 67 year old year old female is seen at the request of Dr. Mendez for evaluation of symptomatic cholelithiasis.  The patient develops RUQ pain following the evening meal.  She does not necessarily associate the discomfort with fat ingestion, however.  There is a history of nausea without vomiting without fever, chills or jaundice.    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  A Lenny filter was placed.    Past Medical History:   Diagnosis Date     Acoustic neuroma 5/4/2012     Anemia 5/4/2012     Anxiety state, unspecified 5/4/2012     Arthritis 5/4/2012     Cervical spine fracture 5/4/2012     Depressive disorder      Diabetes mellitus without mention of complication, 5/4/2012     Esophageal reflux 5/4/2012     History of pulmonary embolism 5/4/2012    with DVT in 2000     History of rectal abscess 5/4/2012     Insomnia 12/29/2014     Major depressive affective disorder, recurrent epi 5/4/2012     Schwannoma 5/4/2012     Unspecified essential hypertension 5/4/2012     Past Surgical History:   Procedure Laterality Date     ARTHROSCOPY KNEE  1996    bilat total knees     BACK SURGERY  2006     BIOPSY BREAST  2009     BIOPSY BREAST  2010     BRAIN TUMOR RESECTION  2000    Acoustic neuroma/schwanoma     COLONOSCOPY  2007    repeat 10 yrs     D&C  1990     deafness in left ear       knee replacement  2/2013    right     Left total knee replacement  11/19/2012    left - Degenerative disc disease     Multi-level cervical spine fusion      Cervical spine multi-level fracture     Right knee cortisone injection  11/26/2012     total knee arthroplasty  2/15/2013    right - knee arthrosis, severe valgus     Current Outpatient Prescriptions   Medication     oxyCODONE-acetaminophen (PERCOCET) 5-325 MG per tablet     traZODone (DESYREL) 100 MG tablet     ALLERGY RELIEF 10 MG tablet      "ANDERSON CONTOUR NEXT test strip     glipiZIDE (GLUCOTROL XL) 5 MG 24 hr tablet     hydrochlorothiazide (HYDRODIURIL) 25 MG tablet     sertraline (ZOLOFT) 50 MG tablet     order for DME     cycloSPORINE (RESTASIS) 0.05 % ophthalmic emulsion     EASY TOUCH LANCETS 32G/TWIST MISC     omeprazole (PRILOSEC) 20 MG capsule     ASPIRIN EC PO     acetaminophen (TYLENOL) 325 MG tablet     [DISCONTINUED] traZODone (DESYREL) 100 MG tablet     No current facility-administered medications for this visit.      Allergies   Allergen Reactions     Hydrocodone Bitartrate      Meperidine Hcl      Morphine Sulfate      HABITS:    Social History   Substance Use Topics     Smoking status: Never Smoker     Smokeless tobacco: Never Used      Comment: no passive smoke exposure     Alcohol use Yes      Comment: beer & wine rarely       Family History   Problem Relation Age of Onset     CEREBROVASCULAR DISEASE Paternal Grandmother      CVA     HEART DISEASE Father      heart disease     Coronary Artery Disease Father      Thyroid Disease No family hx of      Asthma No family hx of        REVIEW OF SYSTEMS:  Ten point review of systems negative except those mentioned in the HPI.     OBJECTIVE:    BP 94/60 (BP Location: Right arm, Patient Position: Chair, Cuff Size: Adult Regular)  Pulse 74  Temp 98.5  F (36.9  C) (Tympanic)  Ht 5' 5\" (1.651 m)  Wt 170 lb (77.1 kg)  SpO2 95%  BMI 28.29 kg/m2    GENERAL: Generally appears well, in no distress with appropriate affect.  HEENT:   Sclerae anicteric - No cervical, supra/infraclavciular lymphadenopathy, no thyroid masses  Respiratory:  Lungs clear to ausculation bilaterally with good air excursion  Cardiovascular:  Regular Rate and Rhythm with no murmurs gallops or rubs, normal   Abdomen:  Soft, nontender without mass, distension or palpable gallbladder.  Cartagena's negative.  :  deferred  Extremities:  Extremities normal. No deformities, edema, or skin discoloration.  Skin:  no suspicious lesions " or rashes  Neurological: grossly intact    Psych:  Alert, oriented, affect appropriate with normal decision making ability.    IMPRESSION:  For laparoscopic cholecystectomy.  She has a angie filter in place for PE due to oral contraceptives given in 1999.  The pathophysiology of biliary tract disease was reviewed at length using a [facility] patient handbook to facilitate the patient's understanding.  The natural course of untreated disease was compared to definitive cholecystectomy with the laparoscopic approach favored.  The patient's questions were asked and answered.    The alternatives to surgery, risks, complications, and the possible need to convert to open cholecystectomy were discussed.  Risks including but not limited to bleeding, infection, pneumonia, blood clots, complications of anesthesia, damage to bowel, bladder, blood vessels, or bile ducts, liver, need for blood transfusions were reviewed.  The patient voices understanding and elects to proceed with surgery.       PLAN:  Scheduled August 7, 2017.   She will need to return 7-10 days prior to surgery to revisit risks/benefits and update history and physical as the interval is excessive from this visit to scheduled surgery.      Huma Pierre MD, FACS    6/20/2017  2:37 PM    cc:  Dr. Mendez

## 2017-06-29 ENCOUNTER — TELEPHONE (OUTPATIENT)
Dept: FAMILY MEDICINE | Facility: OTHER | Age: 67
End: 2017-06-29

## 2017-06-29 NOTE — TELEPHONE ENCOUNTER
9:12 AM    Reason for Call: Phone Call    Description: Rachelle from orthopedics called and states that they have been trying to get to this patient and that she is not answering and getting back to them just wanted to let Bladimir to know.    Was an appointment offered for this call? No    Preferred method for responding to this message: Telephone Call    If we cannot reach you directly, may we leave a detailed response at the number you provided? Yes    Can this message wait until your PCP/provider returns, if available today? Not applicable,     Honey Pulliam

## 2017-06-30 ENCOUNTER — TELEPHONE (OUTPATIENT)
Dept: FAMILY MEDICINE | Facility: OTHER | Age: 67
End: 2017-06-30

## 2017-06-30 NOTE — LETTER
[facility]  [address]  [facility] [address]  Phone: [phone]    June 30, 2017        [address]ilda Ramirez  [address] [facility] [address]          To whom it may concern:    RE: JONI Rojas    Patient has had bilateral hip replacement surgery with titanium hardware, as well as cervical spine surgery with hardware.    Please contact me for questions or concerns.      Sincerely,        Jennifer Bowling MD

## 2017-08-03 ENCOUNTER — OFFICE VISIT (OUTPATIENT)
Dept: FAMILY MEDICINE | Facility: OTHER | Age: 67
End: 2017-08-03
Attending: SURGERY
Payer: COMMERCIAL

## 2017-08-03 VITALS
TEMPERATURE: 97.1 F | OXYGEN SATURATION: 97 % | DIASTOLIC BLOOD PRESSURE: 70 MMHG | HEIGHT: 64 IN | SYSTOLIC BLOOD PRESSURE: 104 MMHG | RESPIRATION RATE: 16 BRPM | BODY MASS INDEX: 29.02 KG/M2 | HEART RATE: 80 BPM | WEIGHT: 170 LBS

## 2017-08-03 DIAGNOSIS — I10 ESSENTIAL HYPERTENSION: ICD-10-CM

## 2017-08-03 DIAGNOSIS — E11.9 TYPE 2 DIABETES MELLITUS WITHOUT COMPLICATION, WITHOUT LONG-TERM CURRENT USE OF INSULIN (H): ICD-10-CM

## 2017-08-03 DIAGNOSIS — S09.90XA CLOSED HEAD INJURY, INITIAL ENCOUNTER: ICD-10-CM

## 2017-08-03 DIAGNOSIS — Z01.818 PREOP GENERAL PHYSICAL EXAM: Primary | ICD-10-CM

## 2017-08-03 DIAGNOSIS — D64.9 ANEMIA, UNSPECIFIED TYPE: ICD-10-CM

## 2017-08-03 LAB
ERYTHROCYTE [DISTWIDTH] IN BLOOD BY AUTOMATED COUNT: 14.8 % (ref 10–15)
HCT VFR BLD AUTO: 43.7 % (ref 35–47)
HGB BLD-MCNC: 14.5 G/DL (ref 11.7–15.7)
MCH RBC QN AUTO: 30 PG (ref 26.5–33)
MCHC RBC AUTO-ENTMCNC: 33.2 G/DL (ref 31.5–36.5)
MCV RBC AUTO: 91 FL (ref 78–100)
PLATELET # BLD AUTO: 201 10E9/L (ref 150–450)
RBC # BLD AUTO: 4.83 10E12/L (ref 3.8–5.2)
WBC # BLD AUTO: 6.2 10E9/L (ref 4–11)

## 2017-08-03 PROCEDURE — 36415 COLL VENOUS BLD VENIPUNCTURE: CPT | Mod: ZL | Performed by: FAMILY MEDICINE

## 2017-08-03 PROCEDURE — 40000788 ZZHCL STATISTIC ESTIMATED AVERAGE GLUCOSE: Mod: ZL | Performed by: FAMILY MEDICINE

## 2017-08-03 PROCEDURE — 83036 HEMOGLOBIN GLYCOSYLATED A1C: CPT | Mod: ZL | Performed by: FAMILY MEDICINE

## 2017-08-03 PROCEDURE — 85027 COMPLETE CBC AUTOMATED: CPT | Mod: ZL | Performed by: FAMILY MEDICINE

## 2017-08-03 PROCEDURE — 99214 OFFICE O/P EST MOD 30 MIN: CPT | Mod: 25 | Performed by: FAMILY MEDICINE

## 2017-08-03 PROCEDURE — 93010 ELECTROCARDIOGRAM REPORT: CPT | Performed by: INTERNAL MEDICINE

## 2017-08-03 PROCEDURE — 93005 ELECTROCARDIOGRAM TRACING: CPT

## 2017-08-03 PROCEDURE — 99213 OFFICE O/P EST LOW 20 MIN: CPT

## 2017-08-03 PROCEDURE — 80048 BASIC METABOLIC PNL TOTAL CA: CPT | Mod: ZL | Performed by: FAMILY MEDICINE

## 2017-08-03 ASSESSMENT — ANXIETY QUESTIONNAIRES
6. BECOMING EASILY ANNOYED OR IRRITABLE: NOT AT ALL
IF YOU CHECKED OFF ANY PROBLEMS ON THIS QUESTIONNAIRE, HOW DIFFICULT HAVE THESE PROBLEMS MADE IT FOR YOU TO DO YOUR WORK, TAKE CARE OF THINGS AT HOME, OR GET ALONG WITH OTHER PEOPLE: NOT DIFFICULT AT ALL
4. TROUBLE RELAXING: NOT AT ALL
5. BEING SO RESTLESS THAT IT IS HARD TO SIT STILL: NOT AT ALL
2. NOT BEING ABLE TO STOP OR CONTROL WORRYING: SEVERAL DAYS
3. WORRYING TOO MUCH ABOUT DIFFERENT THINGS: NOT AT ALL
1. FEELING NERVOUS, ANXIOUS, OR ON EDGE: NOT AT ALL
GAD7 TOTAL SCORE: 1
7. FEELING AFRAID AS IF SOMETHING AWFUL MIGHT HAPPEN: NOT AT ALL

## 2017-08-03 NOTE — MR AVS SNAPSHOT
After Visit Summary   8/3/2017    Nilda Ramirez    MRN: [id number]           Patient Information     Date Of Birth          1950        Visit Information        Provider Department      8/3/2017 3:30 PM Jennifer Bowling MD [facility]        Today's Diagnoses     Preop general physical exam    -  1    Type 2 diabetes mellitus without complication, without long-term current use of insulin (H)        Essential hypertension        Anemia, unspecified type        Closed head injury, initial encounter          Care Instructions      Before Your Surgery      Call your surgeon if there is any change in your health. This includes signs of a cold or flu (such as a sore throat, runny nose, cough, rash or fever).    Do not smoke, drink alcohol or take over the counter medicine (unless your surgeon or primary care doctor tells you to) for the 24 hours before and after surgery.    If you take prescribed drugs: Follow your doctor s orders about which medicines to take and which to stop until after surgery.    Eating and drinking prior to surgery: follow the instructions from your surgeon    Take a shower or bath the night before surgery. Use the soap your surgeon gave you to gently clean your skin. If you do not have soap from your surgeon, use your regular soap. Do not shave or scrub the surgery site.  Wear clean pajamas and have clean sheets on your bed.           Follow-ups after your visit        Follow-up notes from your care team     Return if symptoms worsen or fail to improve.      Your next 10 appointments already scheduled     Aug 07, 2017   Procedure with Huma Pierre MD   HI Periop Services ([facility] )    [address] [id number] [phone]            Aug 08, 2017  5:00 PM CDT   Radiology with HI CT SCAN   HI CT Scan ([facility] )    [address] [id number] [phone]              Who to contact     If  "you have questions or need follow up information about today's clinic visit or your schedule please contact [facility] directly at [phone].  Normal or non-critical lab and imaging results will be communicated to you by MyChart, letter or phone within 4 business days after the clinic has received the results. If you do not hear from us within 7 days, please contact the clinic through Lumetahart or phone. If you have a critical or abnormal lab result, we will notify you by phone as soon as possible.  Submit refill requests through NoFlo or call your pharmacy and they will forward the refill request to us. Please allow 3 business days for your refill to be completed.          Additional Information About Your Visit        LumetaharGutCheck Information     NoFlo lets you send messages to your doctor, view your test results, renew your prescriptions, schedule appointments and more. To sign up, go to [email or web address] . Click on \"Log in\" on the left side of the screen, which will take you to the Welcome page. Then click on \"Sign up Now\" on the right side of the page.     You will be asked to enter the access code listed below, as well as some personal information. Please follow the directions to create your username and password.     Your access code is: [email or web address]  Expires: 2017 12:53 PM     Your access code will  in 90 days. If you need help or a new code, please call your [place] clinic or [phone].        Care EveryWhere ID     This is your Care EveryWhere ID. This could be used by other organizations to access your [place] medical records  [id number]        Your Vitals Were     Pulse Temperature Respirations Height Pulse Oximetry BMI (Body Mass Index)    80 97.1  F (36.2  C) (Tympanic) 16 5' 3.5\" (1.613 m) 97% 29.64 kg/m2       Blood Pressure from Last 3 Encounters:   17 104/70   17 94/60   17 122/86    Weight from Last 3 Encounters:   17 170 lb (77.1 kg) "   06/20/17 170 lb (77.1 kg)   05/23/17 177 lb (80.3 kg)              We Performed the Following     Basic metabolic panel     CBC with platelets     EKG 12-lead complete w/read - (Clinic Performed)     Estimated Average Glucose     Hemoglobin A1c        Primary Care Provider Office Phone # Fax #    Jenniferedgar Bowling [phone]       [facility] [address] [facility] [address]        Equal Access to Services     MCKAYLA PRESLEY : Hadii aad ku hadasho Soomaali, waaxda luqadaha, qaybta kaalmada adeegyada, waxay idiin hayaan adeeg kharabeatriz la'sera . So [facility] [phone].    ATENCIÓN: Si habla español, tiene a felder disposición servicios gratuitos de asistencia lingüística. Ll[facility] [phone].    We comply with applicable federal civil rights laws and [place] laws. We do not discriminate on the basis of race, color, national origin, age, disability sex, sexual orientation or gender identity.            Thank you!     Thank you for choosing [facility]  for your care. Our goal is always to provide you with excellent care. Hearing back from our patients is one way we can continue to improve our services. Please take a few minutes to complete the written survey that you may receive in the mail after your visit with us. Thank you!             Your Updated Medication List - Protect others around you: Learn how to safely use, store and throw away your medicines at www.disposemymeds.org.          This list is accurate as of: 8/3/17 11:59 PM.  Always use your most recent med list.                   Brand Name Dispense Instructions for use Diagnosis    ALLERGY RELIEF 10 MG tablet   Generic drug:  loratadine     90 tablet    TAKE 1 TABLET (10 MG) BY MOUTH DAILY AS NEEDED    Allergic rhinitis       ASPIRIN EC PO      Take 162 mg by mouth daily        ANDERSON CONTOUR NEXT test strip   Generic drug:  blood glucose monitoring     50 each    USE TO TEST BLOOD SUGAR ONCE DAILY OR  AS DIRECTED.    Diabetes mellitus, type 2 (H)       EASY TOUCH LANCETS 32G/TWIST Misc     100 each    AS DIRECTED    Diabetes mellitus, type 2 (H)       glipiZIDE 5 MG 24 hr tablet    GLUCOTROL XL    90 tablet    Take 1 tablet (5 mg) by mouth daily    Type 2 diabetes mellitus without complication, without long-term current use of insulin (H)       hydrochlorothiazide 25 MG tablet    HYDRODIURIL    90 tablet    Take 1 tablet (25 mg) by mouth daily as needed    Essential hypertension       omeprazole 20 MG CR capsule    priLOSEC    90 capsule    Take 1 capsule (20 mg) by mouth daily as needed    Gastroesophageal reflux disease, esophagitis presence not specified       order for DME     100 Units    Incontinence pads, active style, up to 3 daily    Urinary incontinence, unspecified type       oxyCODONE-acetaminophen 5-325 MG per tablet    PERCOCET    30 tablet    Take 1 tablet by mouth nightly as needed for moderate to severe pain    Chronic bilateral low back pain with right-sided sciatica       RESTASIS 0.05 % ophthalmic emulsion   Generic drug:  cycloSPORINE      Place 1 drop into both eyes 2 times daily        sertraline 50 MG tablet    ZOLOFT    90 tablet    Take 1 tablet (50 mg) by mouth daily    Major depressive disorder, recurrent episode, moderate (H)       traZODone 100 MG tablet    DESYREL    90 tablet    TAKE 1 TABLET (100 MG) BY MOUTH AT BEDTIME    Major depressive disorder, recurrent episode (H)       TYLENOL 325 MG tablet   Generic drug:  acetaminophen      Take 1-2 tablets by mouth every 4 hours as needed.

## 2017-08-03 NOTE — NURSING NOTE
"Chief Complaint   Patient presents with     Pre-Op Exam     gall bladder remaval with Felipa [facility] on 8-7-17 and  cataract bilateral  surgery with DR Mcintosh date unknown  (hopefully within 30 days       Initial /70 (BP Location: Right arm, Patient Position: Sitting, Cuff Size: Adult Large)  Pulse 80  Temp 97.1  F (36.2  C) (Tympanic)  Resp 16  Ht 5' 3.5\" (1.613 m)  Wt 170 lb (77.1 kg)  SpO2 97%  BMI 29.64 kg/m2 Estimated body mass index is 29.64 kg/(m^2) as calculated from the following:    Height as of this encounter: 5' 3.5\" (1.613 m).    Weight as of this encounter: 170 lb (77.1 kg).  Medication Reconciliation: complete     Erin Ludwig      "

## 2017-08-03 NOTE — PROGRESS NOTES
[facility]  [address]  [address]  [place] [address]  [phone]  Dept: [phone]    PRE-OP EVALUATION:  Today's date: 8/3/2017    Nilda Ramirez (: 1950) presents for pre-operative evaluation assessment as requested by Dr. WALKER MUNOZ.  She requires evaluation and anesthesia risk assessment prior to undergoing surgery/procedure for treatment of SYMPTOMATIC CHOLELITHIASIS .  Proposed procedure: LAPAROSCOPIC CHOLECYSTECTOMY     Date of Surgery/ Procedure:   Time of Surgery/ Procedure: [facility]  Hospital/Surgical Facility: [facility]  Fax number for surgical facility: ON FILE WITH [facility] OR ELECTRONICALLY  Primary Physician: Jennifer Bowling  Type of Anesthesia Anticipated: to be determined    Patient has a Health Care Directive or Living Will:  YES AT HOME    1. NO - Do you have a history of heart attack, stroke, stent, bypass or surgery on an artery in the head, neck, heart or legs?  2. NO - Do you ever have any pain or discomfort in your chest?  3. NO - Do you have a history of  Heart Failure?  4. NO - Are you troubled by shortness of breath when: walking on the level, up a slight hill or at night?  5. NO - Do you currently have a cold, bronchitis or other respiratory infection?  6. NO - Do you have a cough, shortness of breath or wheezing?  7. NO - DO YOU SOMETIMES GET PAINS IN THE CALVES OF YOUR LEGS WHEN YOU WALK?   8. YES- Do you or anyone in your family have previous history of blood clots?   HEAVY PERIODS GIVEN BIRTH CONTROL AND HAD HORMONAL CLOT  9. NO - Do you or does anyone in your family have a serious bleeding problem such as prolonged bleeding following surgeries or cuts?  10. YES - HAVE YOU EVER HAD PROBLEMS WITH ANEMIA OR BEEN TOLD TO TAKE IRON PILLS? BIRTH OF CHILD   11. NO - Have you had any abnormal blood loss such as black, tarry or bloody stools, or abnormal vaginal bleeding?  12. YES - HAVE YOU EVER HAD A BLOOD TRANSFUSION?   13. NO -  "Have you or any of your relatives ever had problems with anesthesia?  14. NO - Do you have sleep apnea, excessive snoring or daytime drowsiness?  15. NO - Do you have any prosthetic heart valves?  16. YES - DO YOU HAVE PROSTHETIC JOINTS? BILATERAL KNEES  17. NO - Is there any chance that you may be pregnant?        HPI:                                                      Brief HPI related to upcoming procedure: Symptomatic cholelithiasis      DIABETES - Patient has a longstanding history of DiabetesType Type II . Patient is being treated with diet and oral agents and denies significant side effects. Control has been good.                                                                                                           .  HYPERTENSION - Patient has longstanding history of mod-severe HTN , currently denies any symptoms referable to elevated blood pressure. Specifically denies chest pain, palpitations, dyspnea, orthopnea, PND or peripheral edema. Blood pressure readings have been in normal range. Current medication regimen is as listed below. Patient denies any side effects of medication.                                                                                                                                                                                          .  ANEMIA - Patient has a history of moderate severity anemia, which has resolved.        HEAD INJURY - Patient states she recently fell while pushing her friend on vacation in [place].  She landed face forward, and has a contusion to the forehead.  She states she did not go in for evaluation as her insurance could not guarantee the visit would be covered.  She states the bruising has improved, but she feels like her forehead feels different and she is concerned about a \"skull fracture.\"                                                                                                           .    MEDICAL HISTORY:                              "                       Patient Active Problem List    Diagnosis Date Noted     Insomnia 12/29/2014     Priority: Medium     Advanced care planning/counseling discussion 03/01/2013     Priority: Medium     Type 2 diabetes mellitus without complication, without long-term current use of insulin (H) 05/04/2012     Priority: Medium     Benign neoplasm of cranial nerve (H) 05/04/2012     Priority: Medium     Schwannoma of cranial nerve (H) 05/04/2012     Priority: Medium     Closed fracture of cervical vertebra (H) 05/04/2012     Priority: Medium     Anemia 05/04/2012     Priority: Medium     Anxiety state 05/04/2012     Priority: Medium     Arthropathy 05/04/2012     Priority: Medium     History of pulmonary embolism 05/04/2012     Priority: Medium     with DVT in 2000       Major depressive disorder, recurrent episode (H) 05/04/2012     Priority: Medium     Esophageal reflux 05/04/2012     Priority: Medium     Essential hypertension 05/04/2012     Priority: Medium     History of disease of skin and subcutaneous tissue 05/04/2012     Priority: Medium      Past Medical History:   Diagnosis Date     Acoustic neuroma 5/4/2012     Anemia 5/4/2012     Anxiety state, unspecified 5/4/2012     Arthritis 5/4/2012     Cervical spine fracture 5/4/2012     Depressive disorder      Diabetes mellitus without mention of complication, 5/4/2012     Esophageal reflux 5/4/2012     History of pulmonary embolism 5/4/2012    with DVT in 2000     History of rectal abscess 5/4/2012     Insomnia 12/29/2014     Major depressive affective disorder, recurrent epi 5/4/2012     Schwannoma 5/4/2012     Unspecified essential hypertension 5/4/2012     Past Surgical History:   Procedure Laterality Date     ARTHROSCOPY KNEE  1996    bilat total knees     BACK SURGERY  2006     BIOPSY BREAST  2009     BIOPSY BREAST  2010     BRAIN TUMOR RESECTION  2000    Acoustic neuroma/schwanoma     COLONOSCOPY  2007    repeat 10 yrs     D&C  1990     deafness in left  ear       knee replacement  2/2013    right     Left total knee replacement  11/19/2012    left - Degenerative disc disease     Multi-level cervical spine fusion      Cervical spine multi-level fracture     Right knee cortisone injection  11/26/2012     total knee arthroplasty  2/15/2013    right - knee arthrosis, severe valgus     Current Outpatient Prescriptions   Medication Sig Dispense Refill     oxyCODONE-acetaminophen (PERCOCET) 5-325 MG per tablet Take 1 tablet by mouth nightly as needed for moderate to severe pain 30 tablet 0     traZODone (DESYREL) 100 MG tablet TAKE 1 TABLET (100 MG) BY MOUTH AT BEDTIME 90 tablet 1     ALLERGY RELIEF 10 MG tablet TAKE 1 TABLET (10 MG) BY MOUTH DAILY AS NEEDED 90 tablet 3     ANDERSON CONTOUR NEXT test strip USE TO TEST BLOOD SUGAR ONCE DAILY OR AS DIRECTED. 50 each 3     glipiZIDE (GLUCOTROL XL) 5 MG 24 hr tablet Take 1 tablet (5 mg) by mouth daily 90 tablet 3     hydrochlorothiazide (HYDRODIURIL) 25 MG tablet Take 1 tablet (25 mg) by mouth daily as needed 90 tablet 3     sertraline (ZOLOFT) 50 MG tablet Take 1 tablet (50 mg) by mouth daily 90 tablet 3     order for DME Incontinence pads, active style, up to 3 daily 100 Units prn     cycloSPORINE (RESTASIS) 0.05 % ophthalmic emulsion Place 1 drop into both eyes 2 times daily       EASY TOUCH LANCETS 32G/TWIST MISC AS DIRECTED 100 each 2     omeprazole (PRILOSEC) 20 MG capsule Take 1 capsule (20 mg) by mouth daily as needed 90 capsule 3     ASPIRIN EC PO Take 162 mg by mouth daily       acetaminophen (TYLENOL) 325 MG tablet Take 1-2 tablets by mouth every 4 hours as needed.       OTC products: None, except as noted above    Allergies   Allergen Reactions     Hydrocodone Bitartrate      Meperidine Hcl      Morphine Sulfate       Latex Allergy: NO    Social History   Substance Use Topics     Smoking status: Never Smoker     Smokeless tobacco: Never Used      Comment: no passive smoke exposure     Alcohol use Yes      Comment:  "beer & wine rarely     History   Drug Use No       REVIEW OF SYSTEMS:                                                    Constitutional, neuro, ENT, endocrine, pulmonary, cardiac, gastrointestinal, genitourinary, musculoskeletal, integument and psychiatric systems are negative, except as otherwise noted.      EXAM:                                                    /70 (BP Location: Right arm, Patient Position: Sitting, Cuff Size: Adult Large)  Pulse 80  Temp 97.1  F (36.2  C) (Tympanic)  Resp 16  Ht 5' 3.5\" (1.613 m)  Wt 170 lb (77.1 kg)  SpO2 97%  BMI 29.64 kg/m2    GENERAL APPEARANCE: healthy, alert and no distress     EYES: EOMI, PERRL     HENT: ear canals and TM's normal and nose and mouth without ulcers or lesions     NECK: no adenopathy     RESP: lungs clear to auscultation - no rales, rhonchi or wheezes     CV: regular rates and rhythm, normal S1 S2, no S3 or S4 and no murmur, click or rub     ABDOMEN:  soft, nontender, no HSM or masses and bowel sounds normal     SKIN: no suspicious lesions or rashes     NEURO: Normal strength and tone, sensory exam grossly normal, mentation intact and speech normal     PSYCH: mentation appears normal. and affect normal/bright     HEAD: bruising below eyes and resolving contusion of forehead    DIAGNOSTICS:                                                      EKG: appears normal, NSR, normal intervals, no acute ST/T changes c/w ischemia, no LVH by voltage criteria, there are no prior tracings available    Labs Resulted Today:   Results for orders placed or performed in visit on 08/03/17   Hemoglobin A1c   Result Value Ref Range    Hemoglobin A1C 5.5 4.3 - 6.0 %   CBC with platelets   Result Value Ref Range    WBC 6.2 4.0 - 11.0 10e9/L    RBC Count 4.83 3.8 - 5.2 10e12/L    Hemoglobin 14.5 11.7 - 15.7 g/dL    Hematocrit 43.7 35.0 - 47.0 %    MCV 91 78 - 100 fl    MCH 30.0 26.5 - 33.0 pg    MCHC 33.2 31.5 - 36.5 g/dL    RDW 14.8 10.0 - 15.0 %    Platelet Count " 201 150 - 450 10e9/L   Basic metabolic panel   Result Value Ref Range    Sodium 141 133 - 144 mmol/L    Potassium 3.9 3.4 - 5.3 mmol/L    Chloride 106 94 - 109 mmol/L    Carbon Dioxide 27 20 - 32 mmol/L    Anion Gap 8 3 - 14 mmol/L    Glucose 174 (H) 70 - 99 mg/dL    Urea Nitrogen 10 7 - 30 mg/dL    Creatinine 0.75 0.52 - 1.04 mg/dL    GFR Estimate 76 >60 mL/min/1.7m2    GFR Estimate If Black >90   GFR Calc   >60 mL/min/1.7m2    Calcium 8.7 8.5 - 10.1 mg/dL   Estimated Average Glucose   Result Value Ref Range    Estimated Average Glucose 111 mg/dL       Recent Labs   Lab Test  05/04/17   1430  10/09/15   1128   HGB  14.9   --    PLT  244   --    NA  142  136   POTASSIUM  4.0  3.6   CR  1.14*  0.97   A1C  6.3*  5.7        IMPRESSION:                                                    Reason for surgery/procedure: Symptomatic cholelithiasis  Diagnosis/reason for consult: Cardiopulmonary clearance    The proposed surgical procedure is considered INTERMEDIATE risk.    REVISED CARDIAC RISK INDEX  The patient has the following serious cardiovascular risks for perioperative complications such as (MI, PE, VFib and 3  AV Block):  No serious cardiac risks  INTERPRETATION: 0 risks: Class I (very low risk - 0.4% complication rate)    The patient has the following additional risks for perioperative complications:  No identified additional risks      ICD-10-CM    1. Preop general physical exam Z01.818 Hemoglobin A1c     CBC with platelets     EKG 12-lead complete w/read - (Clinic Performed)     Basic metabolic panel     Estimated Average Glucose   2. Type 2 diabetes mellitus without complication, without long-term current use of insulin (H) E11.9 Hemoglobin A1c     Basic metabolic panel   3. Essential hypertension I10    4. Anemia, unspecified type D64.9    5. Closed head injury, initial encounter S09.90XA CT Head w/o Contrast     CT Head w/o Contrast       RECOMMENDATIONS:                                                         Cardiovascular Risk  Performs 4 METs exercise without symptoms (Light housework (dusting, washing dishes), Climb a flight of stairs and Walk on level ground at 15 minutes per mile (4 miles/hour)) .         --Patient is to take all scheduled medications on the day of surgery EXCEPT for modifications listed below.    Diabetes Medication Use  -----Hold usual  oral diabetic meds (e.g. Metformin, Actos, Glipizide) while NPO.       Anticoagulant or Antiplatelet Medication Use  Hold all ASA/NSAIDs/Vitamins/Supplements 7-10 days prior to procedure             APPROVAL GIVEN to proceed with proposed procedure, without further diagnostic evaluation       Signed Electronically by: Jennifer Bowling MD    Copy of this evaluation report is provided to requesting physician.    [facility] Preop Guidelines

## 2017-08-04 ENCOUNTER — ANESTHESIA EVENT (OUTPATIENT)
Dept: SURGERY | Facility: HOSPITAL | Age: 67
End: 2017-08-04
Payer: COMMERCIAL

## 2017-08-04 LAB
ANION GAP SERPL CALCULATED.3IONS-SCNC: 8 MMOL/L (ref 3–14)
BUN SERPL-MCNC: 10 MG/DL (ref 7–30)
CALCIUM SERPL-MCNC: 8.7 MG/DL (ref 8.5–10.1)
CHLORIDE SERPL-SCNC: 106 MMOL/L (ref 94–109)
CO2 SERPL-SCNC: 27 MMOL/L (ref 20–32)
CREAT SERPL-MCNC: 0.75 MG/DL (ref 0.52–1.04)
EST. AVERAGE GLUCOSE BLD GHB EST-MCNC: 111 MG/DL
GFR SERPL CREATININE-BSD FRML MDRD: 76 ML/MIN/1.7M2
GLUCOSE SERPL-MCNC: 174 MG/DL (ref 70–99)
HBA1C MFR BLD: 5.5 % (ref 4.3–6)
POTASSIUM SERPL-SCNC: 3.9 MMOL/L (ref 3.4–5.3)
SODIUM SERPL-SCNC: 141 MMOL/L (ref 133–144)

## 2017-08-04 ASSESSMENT — PATIENT HEALTH QUESTIONNAIRE - PHQ9: SUM OF ALL RESPONSES TO PHQ QUESTIONS 1-9: 1

## 2017-08-04 ASSESSMENT — ANXIETY QUESTIONNAIRES: GAD7 TOTAL SCORE: 1

## 2017-08-04 NOTE — ANESTHESIA PREPROCEDURE EVALUATION
Anesthesia Evaluation     . Pt has had prior anesthetic. Type: General    No history of anesthetic complications          ROS/MED HX    ENT/Pulmonary:     (+), . Other pulmonary disease h/o PE.    Neurologic:     (+)neuropathy - s/p cervical surgery in the past, other neuro Schwannoma; Left-sided Trigeminal Nerve Damage secondary to surgical intervention for acoustic neuroma    Cardiovascular:     (+) Dyslipidemia, hypertension-range: 130-155 systolic, ---. : . . . :. .       METS/Exercise Tolerance:     Hematologic: Comments: H/o PE, on aspirin    (+) History of blood clots pt is not anticoagulated, -      Musculoskeletal:   (+) arthritis, , , other musculoskeletal- DJD, h/o Closed cervical vertebral fx, s/p C4-7 Fusion 2005      GI/Hepatic:     (+) GERD Asymptomatic on medication,       Renal/Genitourinary:         Endo:     (+) type II DM Last HgA1c: 5.5 date: 8/3/2017 Not using insulin - not using insulin pump not previously admitted for DM/DKA .   (-) Type I DM   Psychiatric:     (+) psychiatric history anxiety and depression      Infectious Disease:  - neg infectious disease ROS       Malignancy:      - no malignancy   Other:    (+) H/O Chronic Pain,H/O chronic opiod use ,                    Physical Exam  Normal systems: cardiovascular, pulmonary and dental    Airway   Mallampati: III  TM distance: >3 FB  Neck ROM: limited  Comment: Limited extension     Dental     Cardiovascular   Rhythm and rate: regular and normal      Pulmonary    breath sounds clear to auscultation                    Anesthesia Plan      History & Physical Review  History and physical reviewed and following examination; no interval change.    ASA Status:  2 .    NPO Status:  > 8 hours    Plan for General and ETT with Intravenous and Propofol induction. Maintenance will be Balanced.    PONV prophylaxis:  Ondansetron (or other 5HT-3) and Dexamethasone or Solumedrol       Postoperative Care  Postoperative pain management:  IV analgesics  and Oral pain medications.      Consents  Anesthetic plan, risks, benefits and alternatives discussed with:  Patient..                          .

## 2017-08-07 ENCOUNTER — SURGERY (OUTPATIENT)
Age: 67
End: 2017-08-07

## 2017-08-07 ENCOUNTER — ANESTHESIA (OUTPATIENT)
Dept: SURGERY | Facility: HOSPITAL | Age: 67
End: 2017-08-07
Payer: COMMERCIAL

## 2017-08-07 ENCOUNTER — APPOINTMENT (OUTPATIENT)
Dept: GENERAL RADIOLOGY | Facility: HOSPITAL | Age: 67
End: 2017-08-07
Attending: SURGERY
Payer: COMMERCIAL

## 2017-08-07 ENCOUNTER — HOSPITAL ENCOUNTER (OUTPATIENT)
Facility: HOSPITAL | Age: 67
Discharge: HOME | End: 2017-08-07
Attending: SURGERY | Admitting: SURGERY
Payer: COMMERCIAL

## 2017-08-07 VITALS
DIASTOLIC BLOOD PRESSURE: 93 MMHG | SYSTOLIC BLOOD PRESSURE: 169 MMHG | WEIGHT: 170.6 LBS | HEART RATE: 62 BPM | OXYGEN SATURATION: 93 % | BODY MASS INDEX: 29.75 KG/M2 | TEMPERATURE: 98.1 F | RESPIRATION RATE: 14 BRPM

## 2017-08-07 DIAGNOSIS — Z90.49 S/P LAPAROSCOPIC CHOLECYSTECTOMY: Primary | ICD-10-CM

## 2017-08-07 DIAGNOSIS — G89.29 CHRONIC BILATERAL LOW BACK PAIN WITH RIGHT-SIDED SCIATICA: ICD-10-CM

## 2017-08-07 DIAGNOSIS — M54.41 CHRONIC BILATERAL LOW BACK PAIN WITH RIGHT-SIDED SCIATICA: ICD-10-CM

## 2017-08-07 PROCEDURE — 27210794 ZZH OR GENERAL SUPPLY STERILE: Performed by: SURGERY

## 2017-08-07 PROCEDURE — 71000027 ZZH RECOVERY PHASE 2 EACH 15 MINS: Performed by: SURGERY

## 2017-08-07 PROCEDURE — 01999 UNLISTED ANES PROCEDURE: CPT | Performed by: NURSE ANESTHETIST, CERTIFIED REGISTERED

## 2017-08-07 PROCEDURE — 25000128 H RX IP 250 OP 636: Performed by: NURSE ANESTHETIST, CERTIFIED REGISTERED

## 2017-08-07 PROCEDURE — 36000060 ZZH SURGERY LEVEL 3 W FLUORO 1ST 30 MIN: Performed by: SURGERY

## 2017-08-07 PROCEDURE — 25000128 H RX IP 250 OP 636: Performed by: ANESTHESIOLOGY

## 2017-08-07 PROCEDURE — 27210995 ZZH RX 272: Performed by: SURGERY

## 2017-08-07 PROCEDURE — 27110028 ZZH OR GENERAL SUPPLY NON-STERILE: Performed by: SURGERY

## 2017-08-07 PROCEDURE — 37000008 ZZH ANESTHESIA TECHNICAL FEE, 1ST 30 MIN: Performed by: SURGERY

## 2017-08-07 PROCEDURE — 47563 LAPARO CHOLECYSTECTOMY/GRAPH: CPT | Performed by: SURGERY

## 2017-08-07 PROCEDURE — 25000566 ZZH SEVOFLURANE, EA 15 MIN: Performed by: ANESTHESIOLOGY

## 2017-08-07 PROCEDURE — 40000277 ZZH STATISTIC SURG  C-ARM < 5 MIN FLUORO W STILLS (NON-BILLABLE): Mod: TC

## 2017-08-07 PROCEDURE — 25000128 H RX IP 250 OP 636: Performed by: SURGERY

## 2017-08-07 PROCEDURE — 47563 LAPARO CHOLECYSTECTOMY/GRAPH: CPT | Performed by: ANESTHESIOLOGY

## 2017-08-07 PROCEDURE — 25000132 ZZH RX MED GY IP 250 OP 250 PS 637: Performed by: SURGERY

## 2017-08-07 PROCEDURE — 25000125 ZZHC RX 250: Performed by: SURGERY

## 2017-08-07 PROCEDURE — 40000306 ZZH STATISTIC PRE PROC ASSESS II: Performed by: SURGERY

## 2017-08-07 PROCEDURE — 71000014 ZZH RECOVERY PHASE 1 LEVEL 2 FIRST HR: Performed by: SURGERY

## 2017-08-07 PROCEDURE — 36000058 ZZH SURGERY LEVEL 3 EA 15 ADDTL MIN: Performed by: SURGERY

## 2017-08-07 PROCEDURE — 71000015 ZZH RECOVERY PHASE 1 LEVEL 2 EA ADDTL HR: Performed by: SURGERY

## 2017-08-07 PROCEDURE — 25000125 ZZHC RX 250: Performed by: NURSE ANESTHETIST, CERTIFIED REGISTERED

## 2017-08-07 PROCEDURE — 88304 TISSUE EXAM BY PATHOLOGIST: CPT | Mod: TC | Performed by: SURGERY

## 2017-08-07 PROCEDURE — 40000864: Mod: TC

## 2017-08-07 PROCEDURE — 37000009 ZZH ANESTHESIA TECHNICAL FEE, EACH ADDTL 15 MIN: Performed by: SURGERY

## 2017-08-07 RX ORDER — OXYCODONE AND ACETAMINOPHEN 5; 325 MG/1; MG/1
1 TABLET ORAL EVERY 4 HOURS PRN
Qty: 20 TABLET | Refills: 0 | Status: SHIPPED | OUTPATIENT
Start: 2017-08-07 | End: 2019-07-09

## 2017-08-07 RX ORDER — HYDROMORPHONE HYDROCHLORIDE 1 MG/ML
.3-.5 INJECTION, SOLUTION INTRAMUSCULAR; INTRAVENOUS; SUBCUTANEOUS EVERY 10 MIN PRN
Status: DISCONTINUED | OUTPATIENT
Start: 2017-08-07 | End: 2017-08-07 | Stop reason: HOSPADM

## 2017-08-07 RX ORDER — NEOSTIGMINE METHYLSULFATE 1 MG/ML
VIAL (ML) INJECTION PRN
Status: DISCONTINUED | OUTPATIENT
Start: 2017-08-07 | End: 2017-08-07

## 2017-08-07 RX ORDER — PROPOFOL 10 MG/ML
INJECTION, EMULSION INTRAVENOUS PRN
Status: DISCONTINUED | OUTPATIENT
Start: 2017-08-07 | End: 2017-08-07

## 2017-08-07 RX ORDER — PHENYLEPHRINE HCL IN 0.9% NACL 1 MG/10 ML
SYRINGE (ML) INTRAVENOUS PRN
Status: DISCONTINUED | OUTPATIENT
Start: 2017-08-07 | End: 2017-08-07

## 2017-08-07 RX ORDER — ONDANSETRON 4 MG/1
4 TABLET, ORALLY DISINTEGRATING ORAL EVERY 30 MIN PRN
Status: DISCONTINUED | OUTPATIENT
Start: 2017-08-07 | End: 2017-08-07 | Stop reason: HOSPADM

## 2017-08-07 RX ORDER — HYDRALAZINE HYDROCHLORIDE 20 MG/ML
2.5-5 INJECTION INTRAMUSCULAR; INTRAVENOUS EVERY 10 MIN PRN
Status: DISCONTINUED | OUTPATIENT
Start: 2017-08-07 | End: 2017-08-07 | Stop reason: HOSPADM

## 2017-08-07 RX ORDER — ONDANSETRON 2 MG/ML
INJECTION INTRAMUSCULAR; INTRAVENOUS PRN
Status: DISCONTINUED | OUTPATIENT
Start: 2017-08-07 | End: 2017-08-07

## 2017-08-07 RX ORDER — DEXAMETHASONE SODIUM PHOSPHATE 4 MG/ML
4 INJECTION, SOLUTION INTRA-ARTICULAR; INTRALESIONAL; INTRAMUSCULAR; INTRAVENOUS; SOFT TISSUE EVERY 10 MIN PRN
Status: DISCONTINUED | OUTPATIENT
Start: 2017-08-07 | End: 2017-08-07 | Stop reason: HOSPADM

## 2017-08-07 RX ORDER — EPHEDRINE SULFATE 50 MG/ML
INJECTION, SOLUTION INTRAMUSCULAR; INTRAVENOUS; SUBCUTANEOUS PRN
Status: DISCONTINUED | OUTPATIENT
Start: 2017-08-07 | End: 2017-08-07

## 2017-08-07 RX ORDER — DEXAMETHASONE SODIUM PHOSPHATE 10 MG/ML
INJECTION, SOLUTION INTRAMUSCULAR; INTRAVENOUS PRN
Status: DISCONTINUED | OUTPATIENT
Start: 2017-08-07 | End: 2017-08-07

## 2017-08-07 RX ORDER — OXYCODONE AND ACETAMINOPHEN 5; 325 MG/1; MG/1
1 TABLET ORAL
Status: COMPLETED | OUTPATIENT
Start: 2017-08-07 | End: 2017-08-07

## 2017-08-07 RX ORDER — SODIUM CHLORIDE, SODIUM LACTATE, POTASSIUM CHLORIDE, CALCIUM CHLORIDE 600; 310; 30; 20 MG/100ML; MG/100ML; MG/100ML; MG/100ML
INJECTION, SOLUTION INTRAVENOUS CONTINUOUS
Status: DISCONTINUED | OUTPATIENT
Start: 2017-08-07 | End: 2017-08-07 | Stop reason: HOSPADM

## 2017-08-07 RX ORDER — BUPIVACAINE HYDROCHLORIDE AND EPINEPHRINE 2.5; 5 MG/ML; UG/ML
INJECTION, SOLUTION INFILTRATION; PERINEURAL
Status: DISCONTINUED
Start: 2017-08-07 | End: 2017-08-07 | Stop reason: HOSPADM

## 2017-08-07 RX ORDER — BUPIVACAINE HYDROCHLORIDE AND EPINEPHRINE 2.5; 5 MG/ML; UG/ML
INJECTION, SOLUTION INFILTRATION; PERINEURAL PRN
Status: DISCONTINUED | OUTPATIENT
Start: 2017-08-07 | End: 2017-08-07 | Stop reason: HOSPADM

## 2017-08-07 RX ORDER — LABETALOL HYDROCHLORIDE 5 MG/ML
10 INJECTION, SOLUTION INTRAVENOUS
Status: DISCONTINUED | OUTPATIENT
Start: 2017-08-07 | End: 2017-08-07 | Stop reason: HOSPADM

## 2017-08-07 RX ORDER — PROMETHAZINE HYDROCHLORIDE 25 MG/ML
12.5 INJECTION, SOLUTION INTRAMUSCULAR; INTRAVENOUS
Status: DISCONTINUED | OUTPATIENT
Start: 2017-08-07 | End: 2017-08-07 | Stop reason: HOSPADM

## 2017-08-07 RX ORDER — GLYCOPYRROLATE 0.2 MG/ML
INJECTION, SOLUTION INTRAMUSCULAR; INTRAVENOUS PRN
Status: DISCONTINUED | OUTPATIENT
Start: 2017-08-07 | End: 2017-08-07

## 2017-08-07 RX ORDER — ONDANSETRON 2 MG/ML
4 INJECTION INTRAMUSCULAR; INTRAVENOUS EVERY 30 MIN PRN
Status: DISCONTINUED | OUTPATIENT
Start: 2017-08-07 | End: 2017-08-07 | Stop reason: HOSPADM

## 2017-08-07 RX ORDER — FENTANYL CITRATE 50 UG/ML
INJECTION, SOLUTION INTRAMUSCULAR; INTRAVENOUS PRN
Status: DISCONTINUED | OUTPATIENT
Start: 2017-08-07 | End: 2017-08-07

## 2017-08-07 RX ORDER — ALBUTEROL SULFATE 0.83 MG/ML
2.5 SOLUTION RESPIRATORY (INHALATION) EVERY 4 HOURS PRN
Status: DISCONTINUED | OUTPATIENT
Start: 2017-08-07 | End: 2017-08-07 | Stop reason: HOSPADM

## 2017-08-07 RX ORDER — NALOXONE HYDROCHLORIDE 0.4 MG/ML
.1-.4 INJECTION, SOLUTION INTRAMUSCULAR; INTRAVENOUS; SUBCUTANEOUS
Status: DISCONTINUED | OUTPATIENT
Start: 2017-08-07 | End: 2017-08-07 | Stop reason: HOSPADM

## 2017-08-07 RX ORDER — FENTANYL CITRATE 50 UG/ML
25-50 INJECTION, SOLUTION INTRAMUSCULAR; INTRAVENOUS
Status: DISCONTINUED | OUTPATIENT
Start: 2017-08-07 | End: 2017-08-07 | Stop reason: HOSPADM

## 2017-08-07 RX ORDER — LIDOCAINE HYDROCHLORIDE 20 MG/ML
INJECTION, SOLUTION INFILTRATION; PERINEURAL PRN
Status: DISCONTINUED | OUTPATIENT
Start: 2017-08-07 | End: 2017-08-07

## 2017-08-07 RX ORDER — CEFAZOLIN SODIUM 2 G/100ML
2 INJECTION, SOLUTION INTRAVENOUS
Status: COMPLETED | OUTPATIENT
Start: 2017-08-07 | End: 2017-08-07

## 2017-08-07 RX ORDER — CEFAZOLIN SODIUM 1 G/3ML
INJECTION, POWDER, FOR SOLUTION INTRAMUSCULAR; INTRAVENOUS
Status: DISCONTINUED
Start: 2017-08-07 | End: 2017-08-07 | Stop reason: HOSPADM

## 2017-08-07 RX ADMIN — Medication 5 MG: at 08:21

## 2017-08-07 RX ADMIN — BUPIVACAINE HYDROCHLORIDE AND EPINEPHRINE BITARTRATE 50 ML: 2.5; .005 INJECTION, SOLUTION INFILTRATION; PERINEURAL at 09:20

## 2017-08-07 RX ADMIN — ROCURONIUM BROMIDE 5 MG: 10 INJECTION INTRAVENOUS at 08:13

## 2017-08-07 RX ADMIN — OXYCODONE HYDROCHLORIDE AND ACETAMINOPHEN 1 TABLET: 5; 325 TABLET ORAL at 11:43

## 2017-08-07 RX ADMIN — FENTANYL CITRATE 50 MCG: 50 INJECTION, SOLUTION INTRAMUSCULAR; INTRAVENOUS at 08:13

## 2017-08-07 RX ADMIN — DEXAMETHASONE SODIUM PHOSPHATE 10 MG: 10 INJECTION, SOLUTION INTRAMUSCULAR; INTRAVENOUS at 08:23

## 2017-08-07 RX ADMIN — NEOSTIGMINE METHYLSULFATE 4 MG: 1 INJECTION INTRAMUSCULAR; INTRAVENOUS; SUBCUTANEOUS at 09:16

## 2017-08-07 RX ADMIN — CEFAZOLIN 1 G: 1 INJECTION, POWDER, FOR SOLUTION INTRAMUSCULAR; INTRAVENOUS at 08:53

## 2017-08-07 RX ADMIN — MIDAZOLAM HYDROCHLORIDE 2 MG: 1 INJECTION, SOLUTION INTRAMUSCULAR; INTRAVENOUS at 08:04

## 2017-08-07 RX ADMIN — FENTANYL CITRATE 50 MCG: 50 INJECTION, SOLUTION INTRAMUSCULAR; INTRAVENOUS at 08:27

## 2017-08-07 RX ADMIN — SODIUM CHLORIDE, POTASSIUM CHLORIDE, SODIUM LACTATE AND CALCIUM CHLORIDE: 600; 310; 30; 20 INJECTION, SOLUTION INTRAVENOUS at 08:44

## 2017-08-07 RX ADMIN — Medication 5 MG: at 08:30

## 2017-08-07 RX ADMIN — FENTANYL CITRATE 25 MCG: 50 INJECTION, SOLUTION INTRAMUSCULAR; INTRAVENOUS at 09:28

## 2017-08-07 RX ADMIN — FENTANYL CITRATE 25 MCG: 50 INJECTION, SOLUTION INTRAMUSCULAR; INTRAVENOUS at 10:19

## 2017-08-07 RX ADMIN — LIDOCAINE HYDROCHLORIDE 40 MG: 20 INJECTION, SOLUTION INFILTRATION; PERINEURAL at 08:13

## 2017-08-07 RX ADMIN — ONDANSETRON 4 MG: 2 INJECTION INTRAMUSCULAR; INTRAVENOUS at 09:16

## 2017-08-07 RX ADMIN — Medication 5 MG: at 08:23

## 2017-08-07 RX ADMIN — ONDANSETRON 4 MG: 2 INJECTION INTRAMUSCULAR; INTRAVENOUS at 08:23

## 2017-08-07 RX ADMIN — FENTANYL CITRATE 25 MCG: 50 INJECTION, SOLUTION INTRAMUSCULAR; INTRAVENOUS at 10:25

## 2017-08-07 RX ADMIN — Medication 50 MCG: at 08:20

## 2017-08-07 RX ADMIN — FENTANYL CITRATE 25 MCG: 50 INJECTION, SOLUTION INTRAMUSCULAR; INTRAVENOUS at 09:24

## 2017-08-07 RX ADMIN — ROCURONIUM BROMIDE 10 MG: 10 INJECTION INTRAVENOUS at 08:20

## 2017-08-07 RX ADMIN — FENTANYL CITRATE 25 MCG: 50 INJECTION, SOLUTION INTRAMUSCULAR; INTRAVENOUS at 09:00

## 2017-08-07 RX ADMIN — FENTANYL CITRATE 50 MCG: 50 INJECTION, SOLUTION INTRAMUSCULAR; INTRAVENOUS at 10:33

## 2017-08-07 RX ADMIN — Medication 10 MG: at 08:19

## 2017-08-07 RX ADMIN — FENTANYL CITRATE 25 MCG: 50 INJECTION, SOLUTION INTRAMUSCULAR; INTRAVENOUS at 09:06

## 2017-08-07 RX ADMIN — ROCURONIUM BROMIDE 10 MG: 10 INJECTION INTRAVENOUS at 08:44

## 2017-08-07 RX ADMIN — PROPOFOL 180 MG: 10 INJECTION, EMULSION INTRAVENOUS at 08:13

## 2017-08-07 RX ADMIN — CEFAZOLIN SODIUM 2 G: 2 INJECTION, SOLUTION INTRAVENOUS at 08:16

## 2017-08-07 RX ADMIN — SODIUM CHLORIDE, POTASSIUM CHLORIDE, SODIUM LACTATE AND CALCIUM CHLORIDE: 600; 310; 30; 20 INJECTION, SOLUTION INTRAVENOUS at 07:31

## 2017-08-07 RX ADMIN — GLYCOPYRROLATE 0.7 MG: 0.2 INJECTION, SOLUTION INTRAMUSCULAR; INTRAVENOUS at 09:16

## 2017-08-07 RX ADMIN — Medication 100 MG: at 08:13

## 2017-08-07 NOTE — CONSULTS
[facility] Surgery Preop    CC:  Symptomatic cholelithiasis = preop laparoscopic cholecystectomy    HPI:  This 67 year old woman returns today to proceed with laparoscopic cholecystectomy.  She was due in the office as original consultation 6/20/17 but scheduling did not permit.  She underwent a complete history and physical through Dr. Mendez and is prepared to proceed.   An exhaustive review of the risks/benefits was undertaken as this was planned at the intended office preop.  Risks including but not limited to bleeding, abscess formation, bile duct, bowel injury, need for extensive reoperation and the potential of needing to convert to an open procedure were outlined.  Questions asked and answered.    Past Medical History:   Diagnosis Date     Acoustic neuroma 5/4/2012     Anemia 5/4/2012     Anxiety state, unspecified 5/4/2012     Arthritis 5/4/2012     Cervical spine fracture 5/4/2012     Depressive disorder      Diabetes mellitus without mention of complication, 5/4/2012     Esophageal reflux 5/4/2012     History of pulmonary embolism 5/4/2012    with DVT in 2000     History of rectal abscess 5/4/2012     Insomnia 12/29/2014     Major depressive affective disorder, recurrent epi 5/4/2012     Schwannoma 5/4/2012     Unspecified essential hypertension 5/4/2012     Past Surgical History:   Procedure Laterality Date     ARTHROSCOPY KNEE  1996    bilat total knees     BACK SURGERY  2006     BIOPSY BREAST  2009     BIOPSY BREAST  2010     BRAIN TUMOR RESECTION  2000    Acoustic neuroma/schwanoma     COLONOSCOPY  2007    repeat 10 yrs     D&C  1990     deafness in left ear       knee replacement  2/2013    right     Left total knee replacement  11/19/2012    left - Degenerative disc disease     Multi-level cervical spine fusion      Cervical spine multi-level fracture     Right knee cortisone injection  11/26/2012     total knee arthroplasty  2/15/2013    right - knee arthrosis, severe valgus     Current  Facility-Administered Medications   Medication     lidocaine 1 % 1 mL     sodium chloride (PF) 0.9% PF flush 3 mL     lactated ringers infusion     ceFAZolin (ANCEF) intermittent infusion 1 g     ceFAZolin sodium-dextrose (ANCEF) infusion 2 g     ceFAZolin (ANCEF) 1 GM vial     bupivacaine 0.25 % - EPINEPHrine 1:200,000 0.25% -[id number] injection     Allergies   Allergen Reactions     Hydrocodone Bitartrate      Meperidine Hcl      Morphine Sulfate      HABITS:    Social History   Substance Use Topics     Smoking status: Never Smoker     Smokeless tobacco: Never Used      Comment: no passive smoke exposure     Alcohol use Yes      Comment: beer & wine rarely       Family History   Problem Relation Age of Onset     CEREBROVASCULAR DISEASE Paternal Grandmother      CVA     HEART DISEASE Father      heart disease     Coronary Artery Disease Father      Thyroid Disease No family hx of      Asthma No family hx of        REVIEW OF SYSTEMS:  Ten point review of systems negative except those mentioned in the HPI.     OBJECTIVE:    /89  Pulse 62  Temp 97.1  F (36.2  C) (Temporal)  Resp 16  Wt 170 lb 9.6 oz (77.4 kg)  SpO2 97%  BMI 29.75 kg/m2    GENERAL: Generally appears well, in no distress with appropriate affect.  HEENT:   Sclerae anicteric - No cervical, supra/infraclavciular lymphadenopathy, no thyroid masses  Respiratory:  Lungs clear to ausculation bilaterally with good air excursion  Cardiovascular:  Regular Rate and Rhythm with no murmurs gallops or rubs, normal   Abdomen:  Benign  :  deferred  Extremities:  Extremities normal. No deformities, edema, or skin discoloration.  Skin:  no suspicious lesions or rashes  Neurological: grossly intact    Psych:  Alert, oriented, affect appropriate with normal decision making ability.    IMPRESSION:  For laparoscopic cholecystectomy.  Bailey filter in place but should not affect operative risk/findings or procedure.    PLAN:  Will proceed when DELFINO STEELE  Ignacio CLAROS, FACS    8/7/2017  7:42 AM

## 2017-08-07 NOTE — OR NURSING
Discharge instructions given to patient and patient's cousin. No questions. W/C out of unit. Denies pain, nausea or dizziness

## 2017-08-07 NOTE — ANESTHESIA POSTPROCEDURE EVALUATION
Patient: Nilda Ramirez    Procedure(s):  LAPAROSCOPIC CHOLECYSTECTOMY WITH  CHOLANGIOGRAMS - Wound Class: II-Clean Contaminated    Diagnosis:SYMPTOMATIC CHOLELITHIASIS, HX OF ACOUSTIC NEUROMA, HX OF PULMONARY EMBOLISM  Diagnosis Additional Information: No value filed.    Anesthesia Type:  General, ETT    Note:  Anesthesia Post Evaluation    Patient location during evaluation: Phase 2, PACU and Bedside  Patient participation: Able to fully participate in evaluation  Level of consciousness: awake and alert  Pain management: adequate  Airway patency: patent  Cardiovascular status: acceptable  Respiratory status: acceptable  Hydration status: stable  PONV: none     Anesthetic complications: None          Last vitals:  Vitals:    08/07/17 1200 08/07/17 1215 08/07/17 1230   BP: 147/78 146/90 169/93   Pulse:      Resp: 12 12 14   Temp:   98.1  F (36.7  C)   SpO2: 94% (!) 90% 93%         Electronically Signed By: Esau Charles MD  August 7, 2017  1:56 PM

## 2017-08-07 NOTE — OR NURSING
Pateint discharged to [facility].  Lion score 10/10. Pain level 3/10.  Discharged from unit via cart.  Hand off report given to [facility] rn

## 2017-08-07 NOTE — IP AVS SNAPSHOT
MRN:[id number]                      After Visit Summary   8/7/2017    Nilda Ramirez    MRN: [id number]           Thank you!     Thank you for choosing [facility] for your care. Our goal is always to provide you with excellent care. Hearing back from our patients is one way we can continue to improve our services. Please take a few minutes to complete the written survey that you may receive in the mail after you visit with us. Thank you!        Patient Information     Date Of Birth          1950        About your hospital stay     You were admitted on:  August 7, 2017 You last received care in the:  [facility] Preop/Phase II    You were discharged on:  August 7, 2017       Who to Call     For medical emergencies, please call 911.  For non-urgent questions about your medical care, please call your primary care provider or clinic, [phone]  For questions related to your surgery, please call your surgery clinic        Attending Provider     Provider Specialty    Huma Pierre MD General Surgery       Primary Care Provider Office Phone # Fax #    Jennifer PAZ [facility] [phone]      Your next 10 appointments already scheduled     Aug 08, 2017  5:00 PM CDT   Radiology with HI CT SCAN   HI CT Scan ([facility] )    [address] [address] [phone]            Aug 09, 2017 11:00 AM CDT   (Arrive by 10:45 AM)   Post Op with Huma Pierre MD   [facility] ([facility] )    [address] [place] [address] [id number] [phone]              Further instructions from your care team         Thank you for allowing Dr Pierre and our surgical team to participate in your care. Please call with any questions or concerns to our direct line at [phone] (nurse Claire) or [place] at [phone] - scheduling.    You will be given an appointment in clinic for drain removal.  The nurses will instruct in drain care and emptying.       Appointment in clinic on Wednesday,  8/9/17 for drain removal.    You had a Percocet at hospital at 11:40 am. Next due around 4 pm.    .      Post-Anesthesia Patient Instructions    IMMEDIATELY FOLLOWING SURGERY:  Do not drive or operate machinery for the first twenty four hours after surgery.  Do not make any important decisions for twenty four hours after surgery or while taking narcotic pain medications or sedatives.  If you develop intractable nausea and vomiting or a severe headache please notify your doctor immediately.    FOLLOW-UP:  Please make an appointment with your surgeon as instructed. You do not need to follow up with anesthesia unless specifically instructed to do so.    WOUND CARE INSTRUCTIONS (if applicable):  Keep a dry clean dressing on the anesthesia/puncture wound site if there is drainage.  Once the wound has quit draining you may leave it open to air.  Generally you should leave the bandage intact for twenty four hours unless there is drainage.  If the epidural site drains for more than 36-48 hours please call the anesthesia department.    QUESTIONS?:  Please feel free to call your physician or the hospital  if you have any questions, and they will be happy to assist you.             Pending Results     Date and Time Order Name Status Description    8/7/2017 0904 XR INTRA-OPERATIVE IMAGING [place] In process     8/7/2017 0836 XR Surgery DANE Fluoro L/T 5 Min w Stills In process             Admission Information     Date & Time Provider Department Dept. Phone    8/7/2017 Huma Pierre MD HI Preop/Phase [phone]      Your Vitals Were     Blood Pressure Pulse Temperature Respirations Weight Pulse Oximetry    148/82 62 98.4  F (36.9  C) (Temporal) 14 77.4 kg (170 lb 9.6 oz) 97%    BMI (Body Mass Index)                   29.75 kg/m2           MyChart Information     [employer] lets you send messages to your doctor, view your test results, renew your prescriptions, schedule  "appointments and more. To sign up, go to [email or web address] . Click on \"Log in\" on the left side of the screen, which will take you to the Welcome page. Then click on \"Sign up Now\" on the right side of the page.     You will be asked to enter the access code listed below, as well as some personal information. Please follow the directions to create your username and password.     Your access code is: [email or web address]  Expires: 2017 12:53 PM     Your access code will  in 90 days. If you need help or a new code, please call your [facility] clinic or [phone].        Care EveryWhere ID     This is your Care EveryWhere ID. This could be used by other organizations to access your [facility] medical records  [id number]        Equal Access to Services     MCKAYLA PRESLEY : Kayla Wyatt, sabrina florentino, eber maza, ashleigh begum . So [facility] [phone].    ATENCIÓN: Si habla español, tiene a felder disposición servicios gratuitos de asistencia lingüística. Vikas al [phone].    We comply with applicable federal civil rights laws and [place] laws. We do not discriminate on the basis of race, color, national origin, age, disability sex, sexual orientation or gender identity.               Review of your medicines      CONTINUE these medicines which may have CHANGED, or have new prescriptions. If we are uncertain of the size of tablets/capsules you have at home, strength may be listed as something that might have changed.        Dose / Directions    oxyCODONE-acetaminophen 5-325 MG per tablet   Commonly known as:  PERCOCET   This may have changed:  when to take this   Used for:  Chronic bilateral low back pain with right-sided sciatica        Dose:  1 tablet   Take 1 tablet by mouth every 4 hours as needed for moderate to severe pain   Quantity:  20 tablet   Refills:  0         CONTINUE these medicines which have NOT CHANGED        Dose / Directions    " ALLERGY RELIEF 10 MG tablet   Used for:  Allergic rhinitis   Generic drug:  loratadine        TAKE 1 TABLET (10 MG) BY MOUTH DAILY AS NEEDED   Quantity:  90 tablet   Refills:  3       ASPIRIN EC PO        Dose:  162 mg   Take 162 mg by mouth daily   Refills:  0       ANDERSON CONTOUR NEXT test strip   Used for:  Diabetes mellitus, type 2 (H)   Generic drug:  blood glucose monitoring        USE TO TEST BLOOD SUGAR ONCE DAILY OR AS DIRECTED.   Quantity:  50 each   Refills:  3       EASY TOUCH LANCETS 32G/TWIST Misc   Used for:  Diabetes mellitus, type 2 (H)        AS DIRECTED   Quantity:  100 each   Refills:  2       glipiZIDE 5 MG 24 hr tablet   Commonly known as:  GLUCOTROL XL   Used for:  Type 2 diabetes mellitus without complication, without long-term current use of insulin (H)        Dose:  5 mg   Take 1 tablet (5 mg) by mouth daily   Quantity:  90 tablet   Refills:  3       hydrochlorothiazide 25 MG tablet   Commonly known as:  HYDRODIURIL   Used for:  Essential hypertension        Dose:  25 mg   Take 1 tablet (25 mg) by mouth daily as needed   Quantity:  90 tablet   Refills:  3       omeprazole 20 MG CR capsule   Commonly known as:  priLOSEC   Used for:  Gastroesophageal reflux disease, esophagitis presence not specified        Dose:  20 mg   Take 1 capsule (20 mg) by mouth daily as needed   Quantity:  90 capsule   Refills:  3       order for DME   Used for:  Urinary incontinence, unspecified type        Incontinence pads, active style, up to 3 daily   Quantity:  100 Units   Refills:  prn       RESTASIS 0.05 % ophthalmic emulsion   Generic drug:  cycloSPORINE        Dose:  1 drop   Place 1 drop into both eyes 2 times daily   Refills:  0       sertraline 50 MG tablet   Commonly known as:  ZOLOFT   Used for:  Major depressive disorder, recurrent episode, moderate (H)        Dose:  50 mg   Take 1 tablet (50 mg) by mouth daily   Quantity:  90 tablet   Refills:  3       traZODone 100 MG tablet   Commonly known as:   DESYREL   Used for:  Major depressive disorder, recurrent episode (H)        TAKE 1 TABLET (100 MG) BY MOUTH AT BEDTIME   Quantity:  90 tablet   Refills:  1       TYLENOL 325 MG tablet   Generic drug:  acetaminophen        Dose:  1-2 tablet   Take 1-2 tablets by mouth every 4 hours as needed.   Refills:  0            Where to get your medicines      Some of these will need a paper prescription and others can be bought over the counter. Ask your nurse if you have questions.     Bring a paper prescription for each of these medications     oxyCODONE-acetaminophen 5-325 MG per tablet                Protect others around you: Learn how to safely use, store and throw away your medicines at www.disposemymeds.org.             Medication List: This is a list of all your medications and when to take them. Check marks below indicate your daily home schedule. Keep this list as a reference.      Medications           Morning Afternoon Evening Bedtime As Needed    ALLERGY RELIEF 10 MG tablet   TAKE 1 TABLET (10 MG) BY MOUTH DAILY AS NEEDED   Generic drug:  loratadine                                ASPIRIN EC PO   Take 162 mg by mouth daily                                ANDERSON CONTOUR NEXT test strip   USE TO TEST BLOOD SUGAR ONCE DAILY OR AS DIRECTED.   Generic drug:  blood glucose monitoring                                EASY TOUCH LANCETS 32G/TWIST Misc   AS DIRECTED                                glipiZIDE 5 MG 24 hr tablet   Commonly known as:  GLUCOTROL XL   Take 1 tablet (5 mg) by mouth daily                                hydrochlorothiazide 25 MG tablet   Commonly known as:  HYDRODIURIL   Take 1 tablet (25 mg) by mouth daily as needed                                omeprazole 20 MG CR capsule   Commonly known as:  priLOSEC   Take 1 capsule (20 mg) by mouth daily as needed                                order for DME   Incontinence pads, active style, up to 3 daily                                oxyCODONE-acetaminophen  5-325 MG per tablet   Commonly known as:  PERCOCET   Take 1 tablet by mouth every 4 hours as needed for moderate to severe pain   Last time this was given:  1 tablet on 8/7/2017 11:43 AM                                RESTASIS 0.05 % ophthalmic emulsion   Place 1 drop into both eyes 2 times daily   Generic drug:  cycloSPORINE                                sertraline 50 MG tablet   Commonly known as:  ZOLOFT   Take 1 tablet (50 mg) by mouth daily                                traZODone 100 MG tablet   Commonly known as:  DESYREL   TAKE 1 TABLET (100 MG) BY MOUTH AT BEDTIME                                TYLENOL 325 MG tablet   Take 1-2 tablets by mouth every 4 hours as needed.   Generic drug:  acetaminophen                                          More Information        Discharge Instructions: Caring for Your Errol-Starks Drainage Tube  Your doctor discharges you with a Errol-Starks drainage tube. Doctors commonly leave this drain within the abdominal cavity after surgery. It helps drain and collect blood and body fluid after surgery. This can prevent swelling and reduces the risk for infection. The tube is held in place by a few stitches. It is covered with a bandage. Your doctor will remove the drain when he or she determines you no longer need it.  Home care    Don t sleep on the same side as the tube.    Secure the tube and bag inside your clothing with a safety pin. This helps keep the tube from being pulled out.    Empty your drain at least twice a day. Empty it more often if the drain is full. Wash  and dry your hands before emptying the drain.    Lift the opening on the drain.    Drain the fluid into a measuring cup.    Record the amount of fluid each time you empty the drain. Include the date and time it was emptied. Share this information with your doctor on your next visit.    Squeeze the bulb with your hands until you hear air coming out of the bulb if your doctor has instructed you to do so  (sometimes the bulb is used as a reservoir without suction). Check with your doctor about specific drain instructions.    Close the opening.    Change the dressing around the tube every day.    Wash your hands.    Remove the old bandage.    Wash your hands again.    Wet a cotton swab and clean the skin around the incision and tube site. Use normal saline solution (salt and water). Or, you can use warm, soapy water.    Put a new bandage on the incision and tube site. Make the bandage large enough to cover the whole incision area.    Tape the bandage in place.    Keep the bandage and tube site dry when you shower. Ask your healthcare provider about the best way to do this.     Stripping  the tube helps keep blood clots from blocking the tube. Ask your nurse how often you should strip the tube. Stripping may not be needed, depending on where and why your doctor placed the tube. It may even be dangerous in some cases.     Hold the tubing where it leaves the skin, with one hand. This keeps it from pulling on the skin.    Pinch the tubing with the thumb and first finger of your other hand.    Slowly and firmly pull your thumb and first finger down the tubing. You may find it helpful to hold an alcohol swab between your fingers and the tube to lubricate the tubing.    If the pulling hurts or feels like it s coming out of the skin, stop. Begin again more gently.  Follow-up care  Make a follow-up appointment as directed by our staff.     When to seek medical care  Call your healthcare provider right away if you have any of the following:    New or increased pain around the tube    Redness, swelling, or warmth around the incision or tube    Drainage that is foul-smelling    Vomiting    Fever of 100.4 F (38 C)    Fluid leaking around the tube    Incision seems not to be healing    Stitches become loose    Tube falls out or breaks    Drainage that changes from light pink to dark red    Blood clots in the drainage bulb    A  sudden increase or decrease in the amount of drainage (over 30 mL)   Date Last Reviewed: 2/1/2017 2000-2017 [employer]. [address] [place] [address], [place] [address]. All rights reserved. This information is not intended as a substitute for professional medical care. Always follow your healthcare professional's instructions.

## 2017-08-07 NOTE — DISCHARGE INSTRUCTIONS
Thank you for allowing Dr Pierre and our surgical team to participate in your care. Please call with any questions or concerns to our direct line at [phone] (nurse Claire) or [place] at [phone] - scheduling.    You will be given an appointment in clinic for drain removal.  The nurses will instruct in drain care and emptying.      Appointment in clinic on Wednesday,  8/9/17 for drain removal.    You had a Percocet at hospital at 11:40 am. Next due around 4 pm.    .      Post-Anesthesia Patient Instructions    IMMEDIATELY FOLLOWING SURGERY:  Do not drive or operate machinery for the first twenty four hours after surgery.  Do not make any important decisions for twenty four hours after surgery or while taking narcotic pain medications or sedatives.  If you develop intractable nausea and vomiting or a severe headache please notify your doctor immediately.    FOLLOW-UP:  Please make an appointment with your surgeon as instructed. You do not need to follow up with anesthesia unless specifically instructed to do so.    WOUND CARE INSTRUCTIONS (if applicable):  Keep a dry clean dressing on the anesthesia/puncture wound site if there is drainage.  Once the wound has quit draining you may leave it open to air.  Generally you should leave the bandage intact for twenty four hours unless there is drainage.  If the epidural site drains for more than 36-48 hours please call the anesthesia department.    QUESTIONS?:  Please feel free to call your physician or the hospital  if you have any questions, and they will be happy to assist you.

## 2017-08-07 NOTE — OP NOTE
[place] [facility]    PREOPERATIVE DIAGNOSIS: Symptomatic cholelithiasis.     POSTOPERATIVE DIAGNOSES: Symptomatic cholelithiasis, chronic cholecystitis    PROCEDURE: Laparoscopic cholecystectomy with cholangiography.     HISTORY: This 67 year old female developed abdominal pain and was evaluated   Cholelithiasis was documented and she elects definitive cholecystectomy.     OPERATIVE FINDINGS:  The gallbladder was thick walled; the extrahepatic biliary ductal anatomy was confirmed on cholangiography and was of normal appearance without suggestion of choledocholithiasis. There was free flow of contrast documented into the duodenum.     DESCRIPTION OF PROCEDURE: With the patient in the supine position on the operating room table, general anesthesia was induced. The abdomen was prepped and draped sterilely and the requisite timeout pause observed during which her correct identity and planned procedure were confirmed by the operating room personnel in attendance. An infraumbilical curvilinear incision was made and carried through full thickness skin and subcutaneous tissue.  The fascia was identified and grasped with 2 stay sutures of 0 Vicryl. Under direct vision, the fascia was incised and the abdomen was entered. The Divine cannula was placed and a pneumoperitoneum was achieved with insufflation of carbon dioxide to 15 mmHg pressure and a 10 mm port site was placed in the epigastrium and a single 5 mm port site in the right upper quadrant under direct vision. Inspection showed a mostly thickened gallbladder wall.   The gallbladder was grasped with ratcheted clamp and elevated cephalad. Using blunt dissection, the origin of the cystic duct was identified and the gallbladder neck was released from its reflection on the liver. As the common bile duct appeared generous, it was elected to perform cholangiography.  The [facility] cholangiocatheter was brought onto the table and cholangiography performed  confirming the ductal anatomy and showing normal intrahepatic ducts, a normal common hepatic duct, common bile duct and ampullary region. There was no suggestion of common bile duct filling defect and there was free flow of contrast in the duodenum. The cholangiocatheter was removed and the cystic duct was ligated with Ligaclips approximately 1.0 cm from its common duct junction. The cystic artery was controlled with Ligaclips and divided. The gallbladder was taken down from its bed on the liver in an antegrade fashion with electrocautery dissection and it was retrieved intact in an endocatch bag through the umbilical port site without spillage.     The abdomen was irrigated with copious amounts of antibiotic/saline solution. The camera was placed in the epigastric port and the periumbilical port site fascia was closed under direct vision with figure of eight 0 PDS. All port sites were anesthetized with 0.25% Marcaine with epinephrine and the liver bed was inspected and satisfactory hemostasis confirmed. A 10 flat Errol-Starks was placed though the 5 mm right upper quadrant stab incision and laid in the bed of dissection. The drain was affixed to the skin with 3-0 nylon. The epigastric port site was then closed under direct vision using the Tyrone-Curtis needle and a second figure-of-eight 0 PDS suture. Irrigation fluid was aspirated and the procedure was concluded by removing the instruments and trocars and evacuating the pneumoperitoneum. Both 10 mm port site fascial closures were tied and digital interrogation showed good fascial reapproximation without defect. The wounds were irrigated with antibiotic/saline solution and closed in layers with interrupted 3-0 Vicryl in the subcutaneous tissue. The skin was reapproximated with subdermal 3-0 Vicryl reinforced with Dermabond. Sterile dressings were applied and the patient was transported to the recovery room in satisfactory condition.   The estimated blood loss  was less than 15 cc and there were no apparent intraoperative complications. The procedure was well tolerated and the patient left the operating room in good condition upon confirmation that the final sponge, needle and instruments counts were correct.  The post surgical debriefing was held and acknowledged at completion.      Huma Pierre MD, FACS    8/7/2017  9:40 AM

## 2017-08-07 NOTE — IP AVS SNAPSHOT
HI Preop/Phase II    [address] [address]    Phone:  [phone]                                       After Visit Summary   8/7/2017    Nilda Ramirez    MRN: [id number]           After Visit Summary Signature Page     I have received my discharge instructions, and my questions have been answered. I have discussed any challenges I see with this plan with the nurse or doctor.    ..........................................................................................................................................  Patient/Patient Representative Signature      ..........................................................................................................................................  Patient Representative Print Name and Relationship to Patient    ..................................................               ................................................  Date                                            Time    ..........................................................................................................................................  Reviewed by Signature/Title    ...................................................              ..............................................  Date                                                            Time

## 2017-08-07 NOTE — ANESTHESIA CARE TRANSFER NOTE
Patient: Nilda Ramirez    Procedure(s):  LAPAROSCOPIC CHOLECYSTECTOMY WITH  CHOLANGIOGRAMS - Wound Class: II-Clean Contaminated    Diagnosis: SYMPTOMATIC CHOLELITHIASIS, HX OF ACOUSTIC NEUROMA, HX OF PULMONARY EMBOLISM  Diagnosis Additional Information: No value filed.    Anesthesia Type:   General, ETT     Note:  Airway :Nasal Cannula  Patient transferred to:PACU  Comments: Anesthesia Care Transfer Note    Patient: Nilda Ramirez    Transferred to: PACU    Patient vital signs: Stable    Airway: None    To PACU on supplemental O2.  Placed on monitor.  Report given to RN, all questions answered.  VSS.    Donald Hardy CRNA  8/7/2017      Vitals: (Last set prior to Anesthesia Care Transfer)    CRNA VITALS  8/7/2017 0911 - 8/7/2017 0948      8/7/2017             NIBP: 164/75    Pulse: 91    NIBP Mean: 108    SpO2: 92 %    Resp Rate (observed): 19    Resp Rate (set): 8                Electronically Signed By: GINNY Jimenez CRNA  August 7, 2017  9:48 AM

## 2017-08-08 LAB — COPATH REPORT: NORMAL

## 2017-08-09 ENCOUNTER — OFFICE VISIT (OUTPATIENT)
Dept: SURGERY | Facility: OTHER | Age: 67
End: 2017-08-09
Attending: SURGERY
Payer: COMMERCIAL

## 2017-08-09 VITALS
TEMPERATURE: 96.7 F | BODY MASS INDEX: 28.32 KG/M2 | HEIGHT: 65 IN | DIASTOLIC BLOOD PRESSURE: 78 MMHG | SYSTOLIC BLOOD PRESSURE: 120 MMHG | OXYGEN SATURATION: 96 % | WEIGHT: 170 LBS | HEART RATE: 75 BPM

## 2017-08-09 DIAGNOSIS — Z90.49 STATUS POST LAPAROSCOPIC CHOLECYSTECTOMY: Primary | ICD-10-CM

## 2017-08-09 PROCEDURE — 99212 OFFICE O/P EST SF 10 MIN: CPT

## 2017-08-09 PROCEDURE — 99024 POSTOP FOLLOW-UP VISIT: CPT | Performed by: SURGERY

## 2017-08-09 ASSESSMENT — PAIN SCALES - GENERAL: PAINLEVEL: SEVERE PAIN (7)

## 2017-08-09 NOTE — MR AVS SNAPSHOT
After Visit Summary   8/9/2017    Nilda Ramirez    MRN: [id number]           Patient Information     Date Of Birth          1950        Visit Information        Provider Department      8/9/2017 11:00 AM Huma Pierre MD [facility]        Today's Diagnoses     Status post laparoscopic cholecystectomy    -  1      Care Instructions    Thank you for allowing Dr Pierre and our surgical team to participate in your care. Please call with any questions or concerns to our direct line at [phone] or [place], scheduling at [phone].    Following surgery, activity restrictions remain for 6 weeks to ensure healing and reduce risk of hernia formation in the port site incisions.      Refrain from lifting > 10#, strenuous push/pull, climbing, bending and stooping - After 9/18/17, you may resume activity to tolerance.              Follow-ups after your visit        Your next 10 appointments already scheduled     Aug 14, 2017  2:30 PM CDT   Radiology with HI CT SCAN   HI CT Scan ([facility] )    [address] [place] [id number] [phone]              Who to contact     If you have questions or need follow up information about today's clinic visit or your schedule please contact [facility] directly at [phone].  Normal or non-critical lab and imaging results will be communicated to you by CodeRytehart, letter or phone within 4 business days after the clinic has received the results. If you do not hear from us within 7 days, please contact the clinic through CodeRytehart or phone. If you have a critical or abnormal lab result, we will notify you by phone as soon as possible.  Submit refill requests through Grasshoppers! or call your pharmacy and they will forward the refill request to us. Please allow 3 business days for your refill to be completed.          Additional Information About Your Visit        [employer] Information     Grasshoppers! lets you send messages  "to your doctor, view your test results, renew your prescriptions, schedule appointments and more. To sign up, go to [email or web address] . Click on \"Log in\" on the left side of the screen, which will take you to the Welcome page. Then click on \"Sign up Now\" on the right side of the page.     You will be asked to enter the access code listed below, as well as some personal information. Please follow the directions to create your username and password.     Your access code is: [email or web address]  Expires: 2017 12:53 PM     Your access code will  in 90 days. If you need help or a new code, please call your [facility] clinic or [phone].        Care EveryWhere ID     This is your Care EveryWhere ID. This could be used by other organizations to access your [facility] medical records  [id number]        Your Vitals Were     Pulse Temperature Height Pulse Oximetry BMI (Body Mass Index)       75 96.7  F (35.9  C) (Tympanic) 5' 4.5\" (1.638 m) 96% 28.73 kg/m2        Blood Pressure from Last 3 Encounters:   17 120/78   17 169/93   17 104/70    Weight from Last 3 Encounters:   17 170 lb (77.1 kg)   17 170 lb 9.6 oz (77.4 kg)   17 170 lb (77.1 kg)              Today, you had the following     No orders found for display       Primary Care Provider Office Phone # Fax #    Jennifer Bowling [phone] [address] [facility] [address]        Equal Access to Services     [facility] Center[facility]ENISHA : Hadii katerine Wyatt, sabrina florentino, ashleigh romero. [facility] [phone].    ATENCIÓN: Si habla español, tiene a felder disposición servicios gratuitos de asistencia lingüística. Llame al [phone].    We comply with applicable federal civil rights laws and [place] laws. We do not discriminate on the basis of race, color, national origin, age, disability sex, sexual orientation or gender " identity.            Thank you!     Thank you for choosing [facility]  for your [facility]. Our goal is always to provide you with excellent care. Hearing back from our patients is one way we can continue to improve our services. Please take a few minutes to complete the written survey that you may receive in the mail after your visit with us. Thank you!             Your Updated Medication List - Protect others around you: Learn how to safely use, store and throw away your medicines at www.disposemymeds.org.          This list is accurate as of: 8/9/17 11:21 AM.  Always use your most recent med list.                   Brand Name Dispense Instructions for use Diagnosis    ALLERGY RELIEF 10 MG tablet   Generic drug:  loratadine     90 tablet    TAKE 1 TABLET (10 MG) BY MOUTH DAILY AS NEEDED    Allergic rhinitis       ASPIRIN EC PO      Take 162 mg by mouth daily        ANDERSON CONTOUR NEXT test strip   Generic drug:  blood glucose monitoring     50 each    USE TO TEST BLOOD SUGAR ONCE DAILY OR AS DIRECTED.    Diabetes mellitus, type 2 (H)       EASY TOUCH LANCETS 32G/TWIST Misc     100 each    AS DIRECTED    Diabetes mellitus, type 2 (H)       glipiZIDE 5 MG 24 hr tablet    GLUCOTROL XL    90 tablet    Take 1 tablet (5 mg) by mouth daily    Type 2 diabetes mellitus without complication, without long-term current use of insulin (H)       hydrochlorothiazide 25 MG tablet    HYDRODIURIL    90 tablet    Take 1 tablet (25 mg) by mouth daily as needed    Essential hypertension       omeprazole 20 MG CR capsule    priLOSEC    90 capsule    Take 1 capsule (20 mg) by mouth daily as needed    Gastroesophageal reflux disease, esophagitis presence not specified       order for DME     100 Units    Incontinence pads, active style, up to 3 daily    Urinary incontinence, unspecified type       oxyCODONE-acetaminophen 5-325 MG per tablet    PERCOCET    20 tablet    Take 1 tablet by mouth every 4 hours as needed for moderate to  severe pain    Chronic bilateral low back pain with right-sided sciatica       RESTASIS 0.05 % ophthalmic emulsion   Generic drug:  cycloSPORINE      Place 1 drop into both eyes 2 times daily        sertraline 50 MG tablet    ZOLOFT    90 tablet    Take 1 tablet (50 mg) by mouth daily    Major depressive disorder, recurrent episode, moderate (H)       traZODone 100 MG tablet    DESYREL    90 tablet    TAKE 1 TABLET (100 MG) BY MOUTH AT BEDTIME    Major depressive disorder, recurrent episode (H)       TYLENOL 325 MG tablet   Generic drug:  acetaminophen      Take 1-2 tablets by mouth every 4 hours as needed.

## 2017-08-09 NOTE — NURSING NOTE
"Chief Complaint   Patient presents with     Surgical Followup     cholecystectomy on 8/7/17, jordy drain removal       Initial /78  Pulse 75  Temp 96.7  F (35.9  C) (Tympanic)  Ht 5' 4.5\" (1.638 m)  Wt 170 lb (77.1 kg)  SpO2 96%  BMI 28.73 kg/m2 Estimated body mass index is 28.73 kg/(m^2) as calculated from the following:    Height as of this encounter: 5' 4.5\" (1.638 m).    Weight as of this encounter: 170 lb (77.1 kg).  Medication Reconciliation: complete     Izzy Redding      "

## 2017-08-09 NOTE — PATIENT INSTRUCTIONS
Thank you for allowing Dr Pierre and our surgical team to participate in your care. Please call with any questions or concerns to our direct line at [phone] or kenneth Lovelace at [phone].    Following surgery, activity restrictions remain for 6 weeks to ensure healing and reduce risk of hernia formation in the port site incisions.      Refrain from lifting > 10#, strenuous push/pull, climbing, bending and stooping - After 9/18/17, you may resume activity to tolerance.

## 2017-08-09 NOTE — PROGRESS NOTES
"HPI  Returns 48  hours following uncomplicated laparoscopic cholecystectomy for drain removal without complaint.    ROS  10 point ROS neg other than the symptoms noted above in the HPI.    Physical Exam  /78  Pulse 75  Temp 96.7  F (35.9  C) (Tympanic)  Ht 5' 4.5\" (1.638 m)  Wt 170 lb (77.1 kg)  SpO2 96%  BMI 28.73 kg/m2    Abdomen soft, all port sites healing without evidence of infection.  Drainage - minimal serosanguinous without biliary character.    Drain removed, dressing applied.  To refrain from heavy lifting or strenuous activity for 6 weeks following this procedure.    She voices understanding - followup prn.  The intra-operative cholangiogram films and pathology report were shown to patient and explanations provided.    Huma Pierre MD, FACS    8/9/2017  10:58 AM      "

## 2017-10-20 DIAGNOSIS — F33.9 MAJOR DEPRESSIVE DISORDER, RECURRENT EPISODE (H): ICD-10-CM

## 2017-10-20 DIAGNOSIS — E11.9 DIABETES MELLITUS, TYPE 2 (H): ICD-10-CM

## 2017-10-24 RX ORDER — TRAZODONE HYDROCHLORIDE 100 MG/1
TABLET ORAL
Qty: 90 TABLET | Refills: 1 | Status: SHIPPED | OUTPATIENT
Start: 2017-10-24 | End: 2019-07-09

## 2017-10-24 NOTE — TELEPHONE ENCOUNTER
traZODone (DESYREL) 100 MG tablet    Last Written Prescription Date: 5/19/17  Last Fill Quantity: 90,  # refills: 1   Last Office Visit with [facility], [facility] or [facility] prescribing provider: 8/3/17                                                 ANDERSON CONTOUR NEXT test strip    Last Written Prescription Date: 5/11/17  Last Fill Quantity: 50,  # refills: 3   Last Office Visit with [facility], [facility] or [facility] prescribing provider: 8/3/17

## 2018-05-21 DIAGNOSIS — J30.9 ALLERGIC RHINITIS: ICD-10-CM

## 2018-05-21 DIAGNOSIS — J30.2 CHRONIC SEASONAL ALLERGIC RHINITIS, UNSPECIFIED TRIGGER: Primary | ICD-10-CM

## 2018-05-21 NOTE — TELEPHONE ENCOUNTER
Allergy relief      Last Written Prescription Date:  5/19/17  Last Fill Quantity: 90,   # refills: 3  Last Office Visit: 8/3/17  Future Office visit: none

## 2018-05-21 NOTE — TELEPHONE ENCOUNTER
Clearatidine      Last Written Prescription Date:  Allergy Relief 10mg  5/19/17  Last Fill Quantity: 90,   # refills: 3  Last Office Visit: 8/3/17  Future Office visit:   none    Routing refill request to provider for review/approval because:  Patient fails refill protocol due to age guidelines (3-64)

## 2018-09-10 DIAGNOSIS — F33.1 MAJOR DEPRESSIVE DISORDER, RECURRENT EPISODE, MODERATE (H): ICD-10-CM

## 2018-09-10 DIAGNOSIS — E11.9 TYPE 2 DIABETES MELLITUS WITHOUT COMPLICATION, WITHOUT LONG-TERM CURRENT USE OF INSULIN (H): ICD-10-CM

## 2018-09-11 RX ORDER — GLIPIZIDE 5 MG/1
TABLET, FILM COATED, EXTENDED RELEASE ORAL
Qty: 30 TABLET | Refills: 0 | Status: SHIPPED | OUTPATIENT
Start: 2018-09-11 | End: 2018-10-12

## 2018-09-24 ENCOUNTER — TELEPHONE (OUTPATIENT)
Dept: FAMILY MEDICINE | Facility: OTHER | Age: 68
End: 2018-09-24

## 2018-09-24 NOTE — TELEPHONE ENCOUNTER
4:42 PM    Reason for Call: Phone Call    Description: pt called and made an appt for a med review on 10/11/2018. Pt will be out of medications before this. I told pt to have the pharmacy call or fax us.    Was an appointment offered for this call? Yes  If yes : Appointment type              Date    Preferred method for responding to this message: Telephone Call  What is your phone number ?[phone]    If we cannot reach you directly, may we leave a detailed response at the number you provided? Yes    Can this message wait until your PCP/provider returns, if available today? Not applicable, provider is in    Gricelda Pedro

## 2018-10-11 ENCOUNTER — OFFICE VISIT (OUTPATIENT)
Dept: FAMILY MEDICINE | Facility: OTHER | Age: 68
End: 2018-10-11
Attending: FAMILY MEDICINE
Payer: COMMERCIAL

## 2018-10-11 VITALS
TEMPERATURE: 97.3 F | RESPIRATION RATE: 18 BRPM | HEIGHT: 65 IN | SYSTOLIC BLOOD PRESSURE: 134 MMHG | HEART RATE: 96 BPM | DIASTOLIC BLOOD PRESSURE: 86 MMHG | BODY MASS INDEX: 30.82 KG/M2 | WEIGHT: 185 LBS | OXYGEN SATURATION: 95 %

## 2018-10-11 DIAGNOSIS — D33.3 SCHWANNOMA OF CRANIAL NERVE (H): ICD-10-CM

## 2018-10-11 DIAGNOSIS — F33.1 MODERATE EPISODE OF RECURRENT MAJOR DEPRESSIVE DISORDER (H): ICD-10-CM

## 2018-10-11 DIAGNOSIS — I10 ESSENTIAL HYPERTENSION: ICD-10-CM

## 2018-10-11 DIAGNOSIS — E11.9 TYPE 2 DIABETES MELLITUS WITHOUT COMPLICATION, WITHOUT LONG-TERM CURRENT USE OF INSULIN (H): Primary | ICD-10-CM

## 2018-10-11 PROCEDURE — 80061 LIPID PANEL: CPT | Mod: ZL | Performed by: FAMILY MEDICINE

## 2018-10-11 PROCEDURE — 36415 COLL VENOUS BLD VENIPUNCTURE: CPT | Mod: ZL | Performed by: FAMILY MEDICINE

## 2018-10-11 PROCEDURE — 82043 UR ALBUMIN QUANTITATIVE: CPT | Mod: ZL | Performed by: FAMILY MEDICINE

## 2018-10-11 PROCEDURE — 80048 BASIC METABOLIC PNL TOTAL CA: CPT | Mod: ZL | Performed by: FAMILY MEDICINE

## 2018-10-11 PROCEDURE — 83036 HEMOGLOBIN GLYCOSYLATED A1C: CPT | Mod: ZL | Performed by: FAMILY MEDICINE

## 2018-10-11 PROCEDURE — 84443 ASSAY THYROID STIM HORMONE: CPT | Mod: ZL | Performed by: FAMILY MEDICINE

## 2018-10-11 PROCEDURE — 40000788 ZZHCL STATISTIC ESTIMATED AVERAGE GLUCOSE: Mod: ZL | Performed by: FAMILY MEDICINE

## 2018-10-11 PROCEDURE — 99214 OFFICE O/P EST MOD 30 MIN: CPT | Performed by: FAMILY MEDICINE

## 2018-10-11 PROCEDURE — G0463 HOSPITAL OUTPT CLINIC VISIT: HCPCS

## 2018-10-11 RX ORDER — SERTRALINE HYDROCHLORIDE 100 MG/1
100 TABLET, FILM COATED ORAL DAILY
Qty: 30 TABLET | Refills: 5 | Status: SHIPPED | OUTPATIENT
Start: 2018-10-11 | End: 2019-03-08

## 2018-10-11 ASSESSMENT — ANXIETY QUESTIONNAIRES
7. FEELING AFRAID AS IF SOMETHING AWFUL MIGHT HAPPEN: MORE THAN HALF THE DAYS
3. WORRYING TOO MUCH ABOUT DIFFERENT THINGS: NEARLY EVERY DAY
GAD7 TOTAL SCORE: 13
6. BECOMING EASILY ANNOYED OR IRRITABLE: MORE THAN HALF THE DAYS
2. NOT BEING ABLE TO STOP OR CONTROL WORRYING: NEARLY EVERY DAY
5. BEING SO RESTLESS THAT IT IS HARD TO SIT STILL: NOT AT ALL
4. TROUBLE RELAXING: NEARLY EVERY DAY
IF YOU CHECKED OFF ANY PROBLEMS ON THIS QUESTIONNAIRE, HOW DIFFICULT HAVE THESE PROBLEMS MADE IT FOR YOU TO DO YOUR WORK, TAKE CARE OF THINGS AT HOME, OR GET ALONG WITH OTHER PEOPLE: NOT DIFFICULT AT ALL
1. FEELING NERVOUS, ANXIOUS, OR ON EDGE: NOT AT ALL

## 2018-10-11 ASSESSMENT — PAIN SCALES - GENERAL: PAINLEVEL: MILD PAIN (3)

## 2018-10-11 NOTE — LETTER
My Depression Action Plan  Name: Nilda Ramirez   Date of Birth 1950  Date: 10/11/2018    My doctor: Jennifer Bowling   My clinic: [facility]  [address]  [place] [address]  [phone]          GREEN    ZONE   Good Control    What it looks like:     Things are going generally well. You have normal up s and down s. You may even feel depressed from time to time, but bad moods usually last less than a day.   What you need to do:  1. Continue to care for yourself (see self care plan)  2. Check your depression survival kit and update it as needed  3. Follow your physician s recommendations including any medication.  4. Do not stop taking medication unless you consult with your physician first.           YELLOW         ZONE Getting Worse    What it looks like:     Depression is starting to interfere with your life.     It may be hard to get out of bed; you may be starting to isolate yourself from others.    Symptoms of depression are starting to last most all day and this has happened for several days.     You may have suicidal thoughts but they are not constant.   What you need to do:     1. Call your care team, your response to treatment will improve if you keep your care team informed of your progress. Yellow periods are signs an adjustment may need to be made.     2. Continue your self-care, even if you have to fake it!    3. Talk to someone in your support network    4. Open up your depression survival kit           RED    ZONE Medical Alert - Get Help    What it looks like:     Depression is seriously interfering with your life.     You may experience these or other symptoms: You can t get out of bed most days, can t work or engage in other necessary activities, you have trouble taking care of basic hygiene, or basic responsibilities, thoughts of suicide or death that will not go away, self-injurious behavior.     What you need to do:  1. Call your  care team and request a same-day appointment. If they are not available (weekends or after hours) call your local crisis line, emergency room or 911.            Depression Self Care Plan / Survival Kit    Self-Care for Depression  Here s the deal. Your body and mind are really not as separate as most people think.  What you do and think affects how you feel and how you feel influences what you do and think. This means if you do things that people who feel good do, it will help you feel better.  Sometimes this is all it takes.  There is also a place for medication and therapy depending on how severe your depression is, so be sure to consult with your medical provider and/ or Behavioral Health Consultant if your symptoms are worsening or not improving.     In order to better manage my stress, I will:    Exercise  Get some form of exercise, every day. This will help reduce pain and release endorphins, the  feel good  chemicals in your brain. This is almost as good as taking antidepressants!  This is not the same as joining a gym and then never going! (they count on that by the way ) It can be as simple as just going for a walk or doing some gardening, anything that will get you moving.      Hygiene   Maintain good hygiene (Get out of bed in the morning, Make your bed, Brush your teeth, Take a shower, and Get dressed like you were going to work, even if you are unemployed).  If your clothes don't fit try to get ones that do.    Diet  I will strive to eat foods that are good for me, drink plenty of water, and avoid excessive sugar, caffeine, alcohol, and other mood-altering substances.  Some foods that are helpful in depression are: complex carbohydrates, B vitamins, flaxseed, fish or fish oil, fresh fruits and vegetables.    Psychotherapy  I agree to participate in Individual Therapy (if recommended).    Medication  If prescribed medications, I agree to take them.  Missing doses can result in serious side effects.  I  understand that drinking alcohol, or other illicit drug use, may cause potential side effects.  I will not stop my medication abruptly without first discussing it with my provider.    Staying Connected With Others  I will stay in touch with my friends, family members, and my primary care provider/team.    Use your imagination  Be creative.  We all have a creative side; it doesn t matter if it s oil painting, sand castles, or mud pies! This will also kick up the endorphins.    Witness Beauty  (AKA stop and smell the roses) Take a look outside, even in mid-winter. Notice colors, textures. Watch the squirrels and birds.     Service to others  Be of service to others.  There is always someone else in need.  By helping others we can  get out of ourselves  and remember the really important things.  This also provides opportunities for practicing all the other parts of the program.    Humor  Laugh and be silly!  Adjust your TV habits for less news and crime-drama and more comedy.    Control your stress  Try breathing deep, massage therapy, biofeedback, and meditation. Find time to relax each day.     My support system    Clinic Contact:  Phone number:    Contact 1:  Phone number:    Contact 2:  Phone number:    Latter-day/:  Phone number:    Therapist:  Phone number:    Local crisis center:    Phone number:    Other community support:  Phone number:

## 2018-10-11 NOTE — PROGRESS NOTES
SUBJECTIVE:                                                    Nilda Ramirez is a 68 year old female who presents to clinic today for the following health issues:      Diabetes Follow-up    Patient is checking blood sugars: once daily.  Results are as follows:         Running under 100    Diabetic concerns: None     Symptoms of hypoglycemia (low blood sugar): none     Paresthesias (numbness or burning in feet) or sores: No     Date of last diabetic eye exam: about 1 year ago    BP Readings from Last 2 Encounters:   08/09/17 120/78   08/07/17 169/93     Hemoglobin A1C (%)   Date Value   08/03/2017 5.5   05/04/2017 6.3 (H)     LDL Cholesterol Calculated (mg/dL)   Date Value   05/04/2017 83   10/09/2015 93       Diabetes Management Resources      Hypertension Follow-up      Outpatient blood pressures are not being checked.    Low Salt Diet: no added salt      Depression Followup    Status since last visit: Worsened     See PHQ-9 for current symptoms.  Other associated symptoms: None    Complicating factors:   Significant life event:  No   Current substance abuse:  None  Anxiety or Panic symptoms:  YES    PHQ 5/4/2017 8/3/2017   PHQ-9 Total Score 6 1   Q9: Suicide Ideation Not at all Not at all       PHQ-9  English  PHQ-9   Any Language  Suicide Assessment Five-step Evaluation and Treatment (SAFE-T)        Amount of exercise or physical activity: 2-3 days/week for an average of 15-30 minutes    Problems taking medications regularly: No    Medication side effects: none    Diet: regular (no restrictions)      Patient states she is due for repeat MRI due to brain tumor.      Problem list and histories reviewed & adjusted, as indicated.  Additional history: as documented    Patient Active Problem List   Diagnosis     Advanced care planning/counseling discussion     Type 2 diabetes mellitus without complication, without long-term current use of insulin (H)     Benign neoplasm of cranial nerve (H)     Schwannoma of  cranial nerve (H)     Closed fracture of cervical vertebra (H)     Anemia     Anxiety state     Arthropathy     History of pulmonary embolism     Major depressive disorder, recurrent episode (H)     Esophageal reflux     Essential hypertension     History of disease of skin and subcutaneous tissue     Insomnia     Astigmatism     Dry eyes     Exposure keratopathy     Hypermetropia     Presbyopia     Past Surgical History:   Procedure Laterality Date     ARTHROSCOPY KNEE  1996    bilat total knees     BACK SURGERY  2006     BIOPSY BREAST  2009     BIOPSY BREAST  2010     BRAIN TUMOR RESECTION  2000    Acoustic neuroma/schwanoma     COLONOSCOPY  2007    repeat 10 yrs     D&C  1990     deafness in left ear       knee replacement  2/2013    right     LAPAROSCOPIC CHOLECYSTECTOMY WITH CHOLANGIOGRAMS N/A 8/7/2017    Procedure: LAPAROSCOPIC CHOLECYSTECTOMY WITH CHOLANGIOGRAMS;  LAPAROSCOPIC CHOLECYSTECTOMY WITH  CHOLANGIOGRAMS;  Surgeon: Huma Pierre MD;  Location: [facility]     Left total knee replacement  11/19/2012    left - Degenerative disc disease     Multi-level cervical spine fusion      Cervical spine multi-level fracture     Right knee cortisone injection  11/26/2012     total knee arthroplasty  2/15/2013    right - knee arthrosis, severe valgus       Social History   Substance Use Topics     Smoking status: Never Smoker     Smokeless tobacco: Never Used      Comment: no passive smoke exposure     Alcohol use Yes      Comment: beer & wine rarely     Family History   Problem Relation Age of Onset     Cerebrovascular Disease Paternal Grandmother      CVA     HEART DISEASE Father      heart disease     Coronary Artery Disease Father      Thyroid Disease No family hx of      Asthma No family hx of          Current Outpatient Prescriptions   Medication Sig Dispense Refill     acetaminophen (TYLENOL) 325 MG tablet Take 1-2 tablets by mouth every 4 hours as needed.       ASPIRIN EC PO Take 162 mg by mouth daily        "ANDERSON CONTOUR NEXT test strip USE TO TEST BLOOD SUGAR ONCE DAILY OR AS DIRECTED. 50 strip 6     CLEAR-ATADINE 10 MG tablet TAKE 1 TABLET (10 MG) BY MOUTH DAILY AS NEEDED 90 tablet 3     cycloSPORINE (RESTASIS) 0.05 % ophthalmic emulsion Place 1 drop into both eyes 2 times daily       EASY TOUCH LANCETS 32G/TWIST MISC AS DIRECTED 100 each 2     hydrochlorothiazide (HYDRODIURIL) 25 MG tablet Take 1 tablet (25 mg) by mouth daily as needed 90 tablet 3     omeprazole (PRILOSEC) 20 MG capsule Take 1 capsule (20 mg) by mouth daily as needed 90 capsule 3     order for DME Incontinence pads, active style, up to 3 daily 100 Units prn     oxyCODONE-acetaminophen (PERCOCET) 5-325 MG per tablet Take 1 tablet by mouth every 4 hours as needed for moderate to severe pain 20 tablet 0     sertraline (ZOLOFT) 100 MG tablet Take 1 tablet (100 mg) by mouth daily 30 tablet 5     traZODone (DESYREL) 100 MG tablet TAKE 1 TABLET (100 MG) BY MOUTH AT BEDTIME 90 tablet 1     glipiZIDE (GLUCOTROL XL) 5 MG 24 hr tablet TAKE 1 TABLET (5 MG) BY MOUTH DAILY 30 tablet 3     Allergies   Allergen Reactions     Hydrocodone Bitartrate      Meperidine Hcl      Morphine Sulfate        ROS:  Constitutional, HEENT, cardiovascular, pulmonary, gi and gu systems are negative, except as otherwise noted.    OBJECTIVE:     /86  Pulse 96  Temp 97.3  F (36.3  C) (Tympanic)  Resp 18  Ht 5' 4.5\" (1.638 m)  Wt 185 lb (83.9 kg)  SpO2 95%  BMI 31.26 kg/m2  Body mass index is 31.26 kg/(m^2).  GENERAL: healthy, alert and no distress  PSYCH: mentation appears normal, affect normal/bright    Diagnostic Test Results:  none     ASSESSMENT/PLAN:     Diabetes Type II, A1c Controlled, non-insulin dependent   Associated with the following complications    Renal Complications:  None    Ophthalmologic Complications: None    Neurologic Complications: None    Peripheral Vascular Complications:  None    Other: None   Plan:  Labs:  See orders      Hypertension; " controlled   Associated with the following complications:    Diabetes   Plan:  Labs:   BMP    Depression; recurrent episode-- Moderate   Associated with the following complications:    Diabetes   Plan:  No changes in the patient's current treatment plan      Declined immunizations: Influenza, Pneumococcal and Td due to Other        ICD-10-CM    1. Type 2 diabetes mellitus without complication, without long-term current use of insulin (H) E11.9 Albumin Random Urine Quantitative with Creat Ratio     Hemoglobin A1c     Lipid Profile     TSH     Estimated Average Glucose     Estimated Average Glucose   2. Essential hypertension I10 Basic metabolic panel   3. Moderate episode of recurrent major depressive disorder (H) F33.1 sertraline (ZOLOFT) 100 MG tablet   4. Schwannoma of cranial nerve (H) D33.3 MR Brain w/o & w Contrast       FUTURE APPOINTMENTS:       - Follow-up for annual visit or as needed    Jennifer Bowling MD  [facility]

## 2018-10-11 NOTE — MR AVS SNAPSHOT
After Visit Summary   10/11/2018    Nilda Ramirez    MRN: [id number]           Patient Information     Date Of Birth          1950        Visit Information        Provider Department      10/11/2018 3:30 PM Jennifer Bowling MD [facility]        Today's Diagnoses     Type 2 diabetes mellitus without complication, without long-term current use of insulin (H)    -  1    Essential hypertension        Moderate episode of recurrent major depressive disorder (H)        Schwannoma of cranial nerve (H)           Follow-ups after your visit        Follow-up notes from your care team     Return in about 1 year (around 10/11/2019).      Your next 10 appointments already scheduled     Oct 18, 2018  4:45 PM CDT   (Arrive by 4:30 PM)   MR BRAIN W/O & W CONTRAST with [address] [facility] ([facility] )    [address] [place] [id number] [phone]           How do I prepare for my exam? (Food and drink instructions) **If you will be receiving sedation or general anesthesia, please see special notes below.**  How do I prepare for my exam? (Other instructions) Take your medicines as usual, unless your doctor tells you not to. You may or may not receive intravenous (IV) contrast for this exam pending the discretion of the Radiologist.  You do not need to do anything special to prepare.  **If you will be receiving sedation or general anesthesia, please see special notes below.**  What should I wear: The MRI machine uses a strong magnet. Please wear clothes without metal (snaps, zippers). A sweatsuit works well, or we may give you a hospital gown. Please remove any body piercings and hair extensions before you arrive. You will also remove watches, jewelry, hairpins, wallets, dentures, partial dental plates and hearing aids. You may wear contact lenses, and you may be able to wear your rings. We have a safe place to keep your personal items, but it is safer to leave  them at home.  How long does the exam take: Most tests take 30 to 60 minutes.  HOWEVER, IF YOUR DOCTOR PRESCRIBES ANESTHESIA please plan on spending four to five hours in the recovery room.  What should I bring:  Bring a list of your current medicines to your exam (including vitamins, minerals and over-the-counter drugs).  Do I need a :  **If you will be receiving sedation or general anesthesia, please see special notes below.**  What should I do after the exam: No Restrictions, You may resume normal activities.  What is this test: MRI (magnetic resonance imaging) uses a strong magnet and radio waves to look inside the body. An MRA (magnetic resonance angiogram) does the same thing, but it lets us look at your blood vessels. A computer turns the radio waves into pictures showing cross sections of the body, much like slices of bread. This helps us see any problems more clearly. You may receive fluid (called  contrast ) before or during your scan. The fluid helps us see the pictures better. We give the fluid through an IV (small needle in your arm).  Who should I call with questions:  Please call the Imaging Department at your exam site with any questions. Directions, parking instructions, and other information is available on our website, [email or web address].  How do I prepare if I m having sedation or anesthesia? **IMPORTANT** THE INSTRUCTIONS BELOW ARE ONLY FOR THOSE PATIENTS WHO HAVE BEEN TOLD THEY WILL RECEIVE SEDATION OR GENERAL ANESTHESIA DURING THEIR MRI PROCEDURE:  IF YOU WILL RECEIVE SEDATION (take medicine to help you relax during your exam): You must get the medicine from your doctor before you arrive. Bring the medicine to the exam. Do not take it at home. Arrive one hour early. Bring someone who can take you home after the test. Your medicine will make you sleepy. After the exam, you may not drive, take a bus or take a taxi by yourself. No eating 8 hours before your exam. You may have clear  "liquids up until 4 hours before your exam. (Clear liquids include water, clear tea, black coffee and fruit juice without pulp.)  IF YOU WILL RECEIVE ANESTHESIA (be asleep for your exam): Arrive 1 1/2 hours early. Bring someone who can take you home after the test. You may not drive, take a bus or take a taxi by yourself. No eating 8 hours before your exam. You may have clear liquids up until 4 hours before your exam. (Clear liquids include water, clear tea, black coffee and fruit juice without pulp.)              Who to contact     If you have questions or need follow up information about today's clinic visit or your schedule please contact [facility] directly at [phone].  Normal or non-critical lab and imaging results will be communicated to you by MyChart, letter or phone within 4 business days after the clinic has received the results. If you do not hear from us within 7 days, please contact the clinic through MyChart or phone. If you have a critical or abnormal lab result, we will notify you by phone as soon as possible.  Submit refill requests through [employer] or call your pharmacy and they will forward the refill request to us. Please allow 3 business days for your refill to be completed.          Additional Information About Your Visit        [employer] Information     [employer] lets you send messages to your doctor, view your test results, renew your prescriptions, schedule appointments and more. To sign up, go to [email or web address] . Click on \"Log in\" on the left side of the screen, which will take you to the Welcome page. Then click on \"Sign up Now\" on the right side of the page.     You will be asked to enter the access code listed below, as well as some personal information. Please follow the directions to create your username and password.     Your access code is: [email or web address]  Expires: 2019  5:19 PM     Your access code will  in 90 days. If you need help or a new " "code, please call your [place] clinic or [phone].        Care EveryWhere ID     This is your Care EveryWhere ID. This could be used by other organizations to access your [place] medical records  [id number]        Your Vitals Were     Pulse Temperature Respirations Height Pulse Oximetry BMI (Body Mass Index)    96 97.3  F (36.3  C) (Tympanic) 18 5' 4.5\" (1.638 m) 95% 31.26 kg/m2       Blood Pressure from Last 3 Encounters:   10/11/18 134/86   08/09/17 120/78   08/07/17 169/93    Weight from Last 3 Encounters:   10/11/18 185 lb (83.9 kg)   08/09/17 170 lb (77.1 kg)   08/07/17 170 lb 9.6 oz (77.4 kg)              We Performed the Following     Albumin Random Urine Quantitative with Creat Ratio     Basic metabolic panel     Estimated Average Glucose     Estimated Average Glucose     Hemoglobin A1c     Lipid Profile     TSH          Today's Medication Changes          These changes are accurate as of 10/11/18 11:59 PM.  If you have any questions, ask your nurse or doctor.               These medicines have changed or have updated prescriptions.        Dose/Directions    sertraline 100 MG tablet   Commonly known as:  ZOLOFT   This may have changed:    - medication strength  - See the new instructions.   Used for:  Moderate episode of recurrent major depressive disorder (H)   Changed by:  Jennifer Bowling MD        Dose:  100 mg   Take 1 tablet (100 mg) by mouth daily   Quantity:  30 tablet   Refills:  5            Where to get your medicines      These medications were sent to [facility] - [place], [place] - [address] [place] [address]     Phone:  [phone]     sertraline 100 MG tablet                Primary Care Provider Office Phone # Fax #    Jennifer Bowling [phone] [address] [facility] [address]        Equal Access to Services     MCKAYLA PRESLEY AH: Hadii aad ku hadasho Soomaali, waaxda luqadaha, qaybta kaalmada adeegyada, ashleigh blackwood " philip vosslinkbeatriz andres'aachandan ah. So [facility] [phone].    ATENCIÓN: Si flaco simpson, tiene a felder disposición servicios gratuitos de asistencia lingüística. Vikas hylton [phone].    We comply with applicable federal civil rights laws and [place] laws. We do not discriminate on the basis of race, color, national origin, age, disability, sex, sexual orientation, or gender identity.            Thank you!     Thank you for choosing [facility]  for your care. Our goal is always to provide you with excellent care. Hearing back from our patients is one way we can continue to improve our services. Please take a few minutes to complete the written survey that you may receive in the mail after your visit with us. Thank you!             Your Updated Medication List - Protect others around you: Learn how to safely use, store and throw away your medicines at www.disposemymeds.org.          This list is accurate as of 10/11/18 11:59 PM.  Always use your most recent med list.                   Brand Name Dispense Instructions for use Diagnosis    ASPIRIN EC PO      Take 162 mg by mouth daily        ANDERSON CONTOUR NEXT test strip   Generic drug:  blood glucose monitoring     50 strip    USE TO TEST BLOOD SUGAR ONCE DAILY OR AS DIRECTED.    Diabetes mellitus, type 2 (H)       CLEAR-ATADINE 10 MG tablet   Generic drug:  loratadine     90 tablet    TAKE 1 TABLET (10 MG) BY MOUTH DAILY AS NEEDED    Chronic seasonal allergic rhinitis, unspecified trigger       EASY TOUCH LANCETS 32G/TWIST Misc     100 each    AS DIRECTED    Diabetes mellitus, type 2 (H)       hydrochlorothiazide 25 MG tablet    HYDRODIURIL    90 tablet    Take 1 tablet (25 mg) by mouth daily as needed    Essential hypertension       omeprazole 20 MG CR capsule    priLOSEC    90 capsule    Take 1 capsule (20 mg) by mouth daily as needed    Gastroesophageal reflux disease, esophagitis presence not specified       order for DME     100 Units    Incontinence  pads, active style, up to 3 daily    Urinary incontinence, unspecified type       oxyCODONE-acetaminophen 5-325 MG per tablet    PERCOCET    20 tablet    Take 1 tablet by mouth every 4 hours as needed for moderate to severe pain    Chronic bilateral low back pain with right-sided sciatica       RESTASIS 0.05 % ophthalmic emulsion   Generic drug:  cycloSPORINE      Place 1 drop into both eyes 2 times daily        sertraline 100 MG tablet    ZOLOFT    30 tablet    Take 1 tablet (100 mg) by mouth daily    Moderate episode of recurrent major depressive disorder (H)       traZODone 100 MG tablet    DESYREL    90 tablet    TAKE 1 TABLET (100 MG) BY MOUTH AT BEDTIME    Major depressive disorder, recurrent episode (H)       TYLENOL 325 MG tablet   Generic drug:  acetaminophen      Take 1-2 tablets by mouth every 4 hours as needed.

## 2018-10-11 NOTE — NURSING NOTE
"Chief Complaint   Patient presents with     Diabetes     Hypertension     Depression       Initial /86  Pulse 96  Temp 97.3  F (36.3  C) (Tympanic)  Resp 18  Ht 5' 4.5\" (1.638 m)  Wt 185 lb (83.9 kg)  SpO2 95%  BMI 31.26 kg/m2 Estimated body mass index is 31.26 kg/(m^2) as calculated from the following:    Height as of this encounter: 5' 4.5\" (1.638 m).    Weight as of this encounter: 185 lb (83.9 kg).  Medication Reconciliation: complete    Izzy Barnett LPN  "

## 2018-10-12 LAB
ANION GAP SERPL CALCULATED.3IONS-SCNC: 9 MMOL/L (ref 3–14)
BUN SERPL-MCNC: 15 MG/DL (ref 7–30)
CALCIUM SERPL-MCNC: 8.5 MG/DL (ref 8.5–10.1)
CHLORIDE SERPL-SCNC: 106 MMOL/L (ref 94–109)
CHOLEST SERPL-MCNC: 148 MG/DL
CO2 SERPL-SCNC: 23 MMOL/L (ref 20–32)
CREAT SERPL-MCNC: 0.9 MG/DL (ref 0.52–1.04)
CREAT UR-MCNC: 117 MG/DL
EST. AVERAGE GLUCOSE BLD GHB EST-MCNC: 143 MG/DL
GFR SERPL CREATININE-BSD FRML MDRD: 62 ML/MIN/1.7M2
GLUCOSE SERPL-MCNC: 260 MG/DL (ref 70–99)
HBA1C MFR BLD: 6.6 % (ref 0–5.6)
HDLC SERPL-MCNC: 53 MG/DL
LDLC SERPL CALC-MCNC: 70 MG/DL
MICROALBUMIN UR-MCNC: 32 MG/L
MICROALBUMIN/CREAT UR: 26.92 MG/G CR (ref 0–25)
NONHDLC SERPL-MCNC: 95 MG/DL
POTASSIUM SERPL-SCNC: 4.3 MMOL/L (ref 3.4–5.3)
SODIUM SERPL-SCNC: 138 MMOL/L (ref 133–144)
TRIGL SERPL-MCNC: 123 MG/DL
TSH SERPL DL<=0.005 MIU/L-ACNC: 1.49 MU/L (ref 0.4–4)

## 2018-10-12 ASSESSMENT — PATIENT HEALTH QUESTIONNAIRE - PHQ9: SUM OF ALL RESPONSES TO PHQ QUESTIONS 1-9: 11

## 2018-10-12 ASSESSMENT — ANXIETY QUESTIONNAIRES: GAD7 TOTAL SCORE: 13

## 2018-10-18 ENCOUNTER — HOSPITAL ENCOUNTER (OUTPATIENT)
Dept: MRI IMAGING | Facility: HOSPITAL | Age: 68
Discharge: HOME | End: 2018-10-18
Attending: FAMILY MEDICINE | Admitting: FAMILY MEDICINE
Payer: COMMERCIAL

## 2018-10-18 DIAGNOSIS — D33.3 SCHWANNOMA OF CRANIAL NERVE (H): ICD-10-CM

## 2018-10-18 PROCEDURE — A9585 GADOBUTROL INJECTION: HCPCS | Performed by: RADIOLOGY

## 2018-10-18 PROCEDURE — 25500064 ZZH RX 255 OP 636: Performed by: RADIOLOGY

## 2018-10-18 PROCEDURE — 70553 MRI BRAIN STEM W/O & W/DYE: CPT | Mod: TC

## 2018-10-18 RX ORDER — GADOBUTROL 604.72 MG/ML
7.5 INJECTION INTRAVENOUS ONCE
Status: COMPLETED | OUTPATIENT
Start: 2018-10-18 | End: 2018-10-18

## 2018-10-18 RX ADMIN — GADOBUTROL 7.5 ML: 604.72 INJECTION INTRAVENOUS at 16:50

## 2018-10-24 ENCOUNTER — TELEPHONE (OUTPATIENT)
Dept: FAMILY MEDICINE | Facility: OTHER | Age: 68
End: 2018-10-24

## 2018-10-24 NOTE — TELEPHONE ENCOUNTER
Patient called and she was called yesterday for a new medication.Updated her that Glipizide was sent in and Zoloft was increased.Both sent to pharmacy.She is sure someone called and told her about a new medication yesterday.Any other new medications? She is aware that MD is out.

## 2018-10-25 NOTE — TELEPHONE ENCOUNTER
When I recommended a medication for her blood glucose, I did not see that Glipizide was on her list.  Instead of starting a new medication, we should increase her Glipizide to 10 mg daily, if she is OK with that.  I apologize that I missed that medication the first time.

## 2018-11-16 NOTE — TELEPHONE ENCOUNTER
Spoke with pt and she states she did not start the metformin and she will start taking the 10 of glipizide starting today.  It took us a long time to get a hold of her with several messages left.  MARYBETH for you.

## 2018-11-19 DIAGNOSIS — E11.9 TYPE 2 DIABETES MELLITUS WITHOUT COMPLICATION, WITHOUT LONG-TERM CURRENT USE OF INSULIN (H): ICD-10-CM

## 2018-11-19 RX ORDER — GLIPIZIDE 5 MG/1
TABLET, FILM COATED, EXTENDED RELEASE ORAL
Qty: 30 TABLET | Refills: 3 | Status: SHIPPED | OUTPATIENT
Start: 2018-11-19 | End: 2019-07-09

## 2018-11-19 NOTE — TELEPHONE ENCOUNTER
glipiZIDE (GLUCOTROL XL) 5 MG 24 hr tablet    Last Written Prescription Date:  10/15/18  Last Fill Quantity: 30,   # refills: 3  Last Office Visit: 10/11/18  Future Office visit:

## 2018-11-20 DIAGNOSIS — E11.9 TYPE 2 DIABETES MELLITUS WITHOUT COMPLICATION, WITHOUT LONG-TERM CURRENT USE OF INSULIN (H): Primary | ICD-10-CM

## 2018-11-20 DIAGNOSIS — E11.9 DM2 (DIABETES MELLITUS, TYPE 2) (H): ICD-10-CM

## 2018-11-20 RX ORDER — GLIPIZIDE 10 MG/1
10 TABLET, FILM COATED, EXTENDED RELEASE ORAL DAILY
Qty: 30 TABLET | Refills: 5 | Status: SHIPPED | OUTPATIENT
Start: 2018-11-20 | End: 2019-04-11

## 2018-11-20 NOTE — TELEPHONE ENCOUNTER
Patient called and she states she was called Friday,and Glipizide is to be 10mg. Pended new order.Thank you.

## 2019-01-01 ENCOUNTER — APPOINTMENT (OUTPATIENT)
Dept: CT IMAGING | Facility: HOSPITAL | Age: 69
End: 2019-01-01
Attending: PHYSICIAN ASSISTANT
Payer: COMMERCIAL

## 2019-01-01 ENCOUNTER — HOSPITAL ENCOUNTER (EMERGENCY)
Facility: HOSPITAL | Age: 69
Discharge: HOME | End: 2019-12-16
Attending: PHYSICIAN ASSISTANT | Admitting: PHYSICIAN ASSISTANT
Payer: COMMERCIAL

## 2019-01-01 ENCOUNTER — APPOINTMENT (OUTPATIENT)
Dept: MRI IMAGING | Facility: HOSPITAL | Age: 69
End: 2019-01-01
Attending: PHYSICIAN ASSISTANT
Payer: COMMERCIAL

## 2019-01-01 ENCOUNTER — APPOINTMENT (OUTPATIENT)
Dept: GENERAL RADIOLOGY | Facility: HOSPITAL | Age: 69
End: 2019-01-01
Attending: PHYSICIAN ASSISTANT
Payer: COMMERCIAL

## 2019-01-01 VITALS
WEIGHT: 170 LBS | OXYGEN SATURATION: 96 % | DIASTOLIC BLOOD PRESSURE: 80 MMHG | HEART RATE: 78 BPM | HEIGHT: 65 IN | RESPIRATION RATE: 16 BRPM | SYSTOLIC BLOOD PRESSURE: 119 MMHG | TEMPERATURE: 98.5 F | BODY MASS INDEX: 28.32 KG/M2

## 2019-01-01 DIAGNOSIS — R82.90 MALODOROUS URINE: ICD-10-CM

## 2019-01-01 DIAGNOSIS — R29.898 WEAKNESS OF BOTH LOWER EXTREMITIES: ICD-10-CM

## 2019-01-01 DIAGNOSIS — N13.30 HYDRONEPHROSIS, UNSPECIFIED HYDRONEPHROSIS TYPE: ICD-10-CM

## 2019-01-01 LAB
ALBUMIN UR-MCNC: 30 MG/DL
ANION GAP SERPL CALCULATED.3IONS-SCNC: 10 MMOL/L (ref 3–14)
APPEARANCE UR: CLEAR
BACTERIA #/AREA URNS HPF: ABNORMAL /HPF
BASOPHILS # BLD AUTO: 0.1 10E9/L (ref 0–0.2)
BASOPHILS NFR BLD AUTO: 0.5 %
BILIRUB UR QL STRIP: ABNORMAL
BUN SERPL-MCNC: 18 MG/DL (ref 7–30)
CALCIUM SERPL-MCNC: 9.2 MG/DL (ref 8.5–10.1)
CHLORIDE SERPL-SCNC: 103 MMOL/L (ref 94–109)
CO2 SERPL-SCNC: 25 MMOL/L (ref 20–32)
COLOR UR AUTO: YELLOW
CREAT SERPL-MCNC: 0.64 MG/DL (ref 0.52–1.04)
DIFFERENTIAL METHOD BLD: NORMAL
EOSINOPHIL # BLD AUTO: 0.3 10E9/L (ref 0–0.7)
EOSINOPHIL NFR BLD AUTO: 2.4 %
ERYTHROCYTE [DISTWIDTH] IN BLOOD BY AUTOMATED COUNT: 13.2 % (ref 10–15)
GFR SERPL CREATININE-BSD FRML MDRD: 90 ML/MIN/{1.73_M2}
GLUCOSE SERPL-MCNC: 143 MG/DL (ref 70–99)
GLUCOSE UR STRIP-MCNC: NEGATIVE MG/DL
HCT VFR BLD AUTO: 41.1 % (ref 35–47)
HGB BLD-MCNC: 13.7 G/DL (ref 11.7–15.7)
HGB UR QL STRIP: NEGATIVE
HYALINE CASTS #/AREA URNS LPF: 3 /LPF
IMM GRANULOCYTES # BLD: 0.1 10E9/L (ref 0–0.4)
IMM GRANULOCYTES NFR BLD: 0.6 %
KETONES UR STRIP-MCNC: 40 MG/DL
LEUKOCYTE ESTERASE UR QL STRIP: ABNORMAL
LYMPHOCYTES # BLD AUTO: 2.1 10E9/L (ref 0.8–5.3)
LYMPHOCYTES NFR BLD AUTO: 19.6 %
MCH RBC QN AUTO: 28.7 PG (ref 26.5–33)
MCHC RBC AUTO-ENTMCNC: 33.3 G/DL (ref 31.5–36.5)
MCV RBC AUTO: 86 FL (ref 78–100)
MONOCYTES # BLD AUTO: 0.8 10E9/L (ref 0–1.3)
MONOCYTES NFR BLD AUTO: 7.6 %
MUCOUS THREADS #/AREA URNS LPF: PRESENT /LPF
NEUTROPHILS # BLD AUTO: 7.3 10E9/L (ref 1.6–8.3)
NEUTROPHILS NFR BLD AUTO: 69.3 %
NITRATE UR QL: NEGATIVE
NRBC # BLD AUTO: 0 10*3/UL
NRBC BLD AUTO-RTO: 0 /100
PH UR STRIP: 6 PH (ref 4.7–8)
PLATELET # BLD AUTO: 243 10E9/L (ref 150–450)
POTASSIUM SERPL-SCNC: 3 MMOL/L (ref 3.4–5.3)
RBC # BLD AUTO: 4.77 10E12/L (ref 3.8–5.2)
RBC #/AREA URNS AUTO: <1 /HPF (ref 0–2)
SODIUM SERPL-SCNC: 138 MMOL/L (ref 133–144)
SOURCE: ABNORMAL
SP GR UR STRIP: 1.03 (ref 1–1.03)
SQUAMOUS #/AREA URNS AUTO: 7 /HPF (ref 0–1)
TROPONIN I SERPL-MCNC: <0.015 UG/L (ref 0–0.04)
TSH SERPL DL<=0.005 MIU/L-ACNC: 1.5 MU/L (ref 0.4–4)
UROBILINOGEN UR STRIP-MCNC: 4 MG/DL (ref 0–2)
WBC # BLD AUTO: 10.5 10E9/L (ref 4–11)
WBC #/AREA URNS AUTO: 2 /HPF (ref 0–5)

## 2019-01-01 PROCEDURE — 99284 EMERGENCY DEPT VISIT MOD MDM: CPT | Mod: Z6 | Performed by: PHYSICIAN ASSISTANT

## 2019-01-01 PROCEDURE — 85025 COMPLETE CBC W/AUTO DIFF WBC: CPT | Performed by: PHYSICIAN ASSISTANT

## 2019-01-01 PROCEDURE — 84484 ASSAY OF TROPONIN QUANT: CPT | Performed by: PHYSICIAN ASSISTANT

## 2019-01-01 PROCEDURE — 74176 CT ABD & PELVIS W/O CONTRAST: CPT | Mod: TC

## 2019-01-01 PROCEDURE — 99285 EMERGENCY DEPT VISIT HI MDM: CPT | Mod: 25

## 2019-01-01 PROCEDURE — 84443 ASSAY THYROID STIM HORMONE: CPT | Performed by: PHYSICIAN ASSISTANT

## 2019-01-01 PROCEDURE — 70450 CT HEAD/BRAIN W/O DYE: CPT | Mod: TC

## 2019-01-01 PROCEDURE — 93010 ELECTROCARDIOGRAM REPORT: CPT | Performed by: INTERNAL MEDICINE

## 2019-01-01 PROCEDURE — 25000132 ZZH RX MED GY IP 250 OP 250 PS 637: Performed by: PHYSICIAN ASSISTANT

## 2019-01-01 PROCEDURE — 80048 BASIC METABOLIC PNL TOTAL CA: CPT | Performed by: PHYSICIAN ASSISTANT

## 2019-01-01 PROCEDURE — 73522 X-RAY EXAM HIPS BI 3-4 VIEWS: CPT | Mod: TC

## 2019-01-01 PROCEDURE — 36415 COLL VENOUS BLD VENIPUNCTURE: CPT | Performed by: PHYSICIAN ASSISTANT

## 2019-01-01 PROCEDURE — 81001 URINALYSIS AUTO W/SCOPE: CPT | Performed by: PHYSICIAN ASSISTANT

## 2019-01-01 PROCEDURE — 93005 ELECTROCARDIOGRAM TRACING: CPT

## 2019-01-01 PROCEDURE — 72148 MRI LUMBAR SPINE W/O DYE: CPT | Mod: TC

## 2019-01-01 RX ORDER — CEPHALEXIN 500 MG/1
500 CAPSULE ORAL ONCE
Status: COMPLETED | OUTPATIENT
Start: 2019-01-01 | End: 2019-01-01

## 2019-01-01 RX ORDER — CEPHALEXIN 500 MG/1
500 CAPSULE ORAL 2 TIMES DAILY
Qty: 14 CAPSULE | Refills: 0 | Status: SHIPPED | OUTPATIENT
Start: 2019-01-01 | End: 2020-01-01

## 2019-01-01 RX ADMIN — CEPHALEXIN 500 MG: 500 CAPSULE ORAL at 21:09

## 2019-01-01 ASSESSMENT — ENCOUNTER SYMPTOMS
HEMATOLOGIC/LYMPHATIC NEGATIVE: 1
SPEECH DIFFICULTY: 0
FATIGUE: 0
GASTROINTESTINAL NEGATIVE: 1
FEVER: 0
NUMBNESS: 0
NECK PAIN: 0
HEADACHES: 0
EYES NEGATIVE: 1
SEIZURES: 0
CARDIOVASCULAR NEGATIVE: 1
RESPIRATORY NEGATIVE: 1

## 2019-01-01 ASSESSMENT — MIFFLIN-ST. JEOR: SCORE: 1279.05

## 2019-01-10 ENCOUNTER — MEDICAL CORRESPONDENCE (OUTPATIENT)
Dept: HEALTH INFORMATION MANAGEMENT | Facility: CLINIC | Age: 69
End: 2019-01-10

## 2019-03-08 DIAGNOSIS — F33.1 MODERATE EPISODE OF RECURRENT MAJOR DEPRESSIVE DISORDER (H): ICD-10-CM

## 2019-03-11 RX ORDER — SERTRALINE HYDROCHLORIDE 100 MG/1
100 TABLET, FILM COATED ORAL DAILY
Qty: 30 TABLET | Refills: 2 | Status: SHIPPED | OUTPATIENT
Start: 2019-03-11 | End: 2019-06-10

## 2019-03-11 NOTE — TELEPHONE ENCOUNTER
Zoloft  Last Written Prescription Date: 10/11/18  Last Fill Quantity: 30 # of Refills: 5  Last Office Visit: 10/11/18

## 2019-04-11 DIAGNOSIS — E11.9 TYPE 2 DIABETES MELLITUS WITHOUT COMPLICATION, WITHOUT LONG-TERM CURRENT USE OF INSULIN (H): ICD-10-CM

## 2019-04-12 RX ORDER — GLIPIZIDE 10 MG/1
10 TABLET, FILM COATED, EXTENDED RELEASE ORAL DAILY
Qty: 30 TABLET | Refills: 0 | Status: SHIPPED | OUTPATIENT
Start: 2019-04-12 | End: 2019-06-15

## 2019-06-15 DIAGNOSIS — E11.9 TYPE 2 DIABETES MELLITUS WITHOUT COMPLICATION, WITHOUT LONG-TERM CURRENT USE OF INSULIN (H): ICD-10-CM

## 2019-06-17 RX ORDER — GLIPIZIDE 10 MG/1
10 TABLET, FILM COATED, EXTENDED RELEASE ORAL DAILY
Qty: 30 TABLET | Refills: 0 | Status: SHIPPED | OUTPATIENT
Start: 2019-06-17 | End: 2019-07-09

## 2019-07-05 NOTE — PATIENT INSTRUCTIONS
Preventive Health Recommendations    See your health care provider every year to    Review health changes.     Discuss preventive care.      Review your medicines if your doctor has prescribed any.      You no longer need a yearly Pap test unless you've had an abnormal Pap test in the past 10 years. If you have vaginal symptoms, such as bleeding or discharge, be sure to talk with your provider about a Pap test.      Every 1 to 2 years, have a mammogram.  If you are over 69, talk with your health care provider about whether or not you want to continue having screening mammograms.      Every 10 years, have a colonoscopy. Or, have a yearly FIT test (stool test). These exams will check for colon cancer.       Have a cholesterol test every 5 years, or more often if your doctor advises it.       Have a diabetes test (fasting glucose) every three years. If you are at risk for diabetes, you should have this test more often.       At age 65, have a bone density scan (DEXA) to check for osteoporosis (brittle bone disease).    Shots:    Get a flu shot each year.    Get a tetanus shot every 10 years.    Talk to your doctor about your pneumonia vaccines. There are now two you should receive - Pneumovax (PPSV 23) and Prevnar (PCV 13).    Talk to your pharmacist about the shingles vaccine.    Talk to your doctor about the hepatitis B vaccine.    Nutrition:     Eat at least 5 servings of fruits and vegetables each day.      Eat whole-grain bread, whole-wheat pasta and brown rice instead of white grains and rice.      Get adequate about Calcium and Vitamin D.     Lifestyle    Exercise at least 150 minutes a week (30 minutes a day, 5 days a week). This will help you control your weight and prevent disease.      Limit alcohol to one drink per day.      No smoking.       Wear sunscreen to prevent skin cancer.       See your dentist twice a year for an exam and cleaning.      See your eye doctor every 1 to 2 years to screen for  conditions such as glaucoma, macular degeneration, cataracts, etc.    Personalized Prevention Plan  You are due for the preventive services outlined below.  Your care team is available to assist you in scheduling these services.  If you have already completed any of these items, please share that information with your care team to update in your medical record.    Health Maintenance Due   Topic Date Due     Diabetic Foot Exam  1950     Stool guaic - yearly  06/09/1968     Diptheria Tetanus Pertussis (DTAP/TDAP/TD) Vaccine (1 - Tdap) 06/09/1973     Zoster (Shingles) Vaccine (1 of 2) 06/09/1998     Annual Wellness Visit  06/09/2013     Eye Exam  01/26/2016     Osteoporosis Screening  01/01/2018     A1C Lab  04/11/2019     Depression Assessment  04/11/2019     Mammogram  05/04/2019

## 2019-07-05 NOTE — PROGRESS NOTES
SUBJECTIVE:     Subjective     Nilda Ramirez is a 69 year old female who presents to clinic today for the following health issues:    HPI   Diabetes Follow-up      How often are you checking your blood sugar? One time daily    What time of day are you checking your blood sugars (select all that apply)?  Before meals    Have you had any blood sugars above 200?  No    Have you had any blood sugars below 70?  No    What symptoms do you notice when your blood sugar is low?  Dizzy and Weak    What concerns do you have today about your diabetes? None     Do you have any of these symptoms? (Select all that apply)  No numbness or tingling in feet.  No redness, sores or blisters on feet.  No complaints of excessive thirst.  No reports of blurry vision.  No significant changes to weight.     Have you had a diabetic eye exam in the last 12 months? No    BP Readings from Last 2 Encounters:   07/09/19 128/80   10/11/18 134/86     Hemoglobin A1C (%)   Date Value   07/09/2019 7.1 (H)   10/11/2018 6.6 (H)     LDL Cholesterol Calculated (mg/dL)   Date Value   07/09/2019 103 (H)   10/11/2018 70       Diabetes Management Resources      Hypertension Follow-up      Do you check your blood pressure regularly outside of the clinic? No     Are you following a low salt diet? Yes    Are your blood pressures ever more than 140 on the top number (systolic) OR more   than 90 on the bottom number (diastolic), for example 140/90? No       Depression and Anxiety Follow-Up    How are you doing with your depression since your last visit? Worsened     How are you doing with your anxiety since your last visit?  Worsened     Are you having other symptoms that might be associated with depression or anxiety? Yes:  over eating    Have you had a significant life event? Grief or Loss and OTHER: son passed in january and recently found out she was adopted     Do you have any concerns with your use of alcohol or other drugs? No    Social History      Tobacco Use     Smoking status: Never Smoker     Smokeless tobacco: Never Used     Tobacco comment: no passive smoke exposure   Substance Use Topics     Alcohol use: Yes     Comment: beer & wine rarely     Drug use: No     PHQ 5/4/2017 8/3/2017 10/11/2018   PHQ-9 Total Score 6 1 11   Q9: Thoughts of better off dead/self-harm past 2 weeks Not at all Not at all Not at all     LAVINIA-7 SCORE 8/3/2017 10/11/2018   Total Score 1 13       Suicide Assessment Five-step Evaluation and Treatment (SAFE-T)      Amount of exercise or physical activity: None    Problems taking medications regularly: No    Medication side effects: none    Diet: low salt      Patient Active Problem List   Diagnosis     Advanced care planning/counseling discussion     Type 2 diabetes mellitus without complication, without long-term current use of insulin (H)     Benign neoplasm of cranial nerve (H)     Schwannoma of cranial nerve (H)     Closed fracture of cervical vertebra (H)     Anemia     Anxiety state     Arthropathy     History of pulmonary embolism     Major depressive disorder, recurrent episode (H)     Esophageal reflux     Essential hypertension     History of disease of skin and subcutaneous tissue     Insomnia     Astigmatism     Dry eyes     Exposure keratopathy     Hypermetropia     Presbyopia     Past Surgical History:   Procedure Laterality Date     ARTHROSCOPY KNEE  1996    bilat total knees     BACK SURGERY  2006     BIOPSY BREAST  2009     BIOPSY BREAST  2010     BRAIN TUMOR RESECTION  2000    Acoustic neuroma/schwanoma     COLONOSCOPY  2007    repeat 10 yrs     D&C  1990     deafness in left ear       knee replacement  2/2013    right     LAPAROSCOPIC CHOLECYSTECTOMY WITH CHOLANGIOGRAMS N/A 8/7/2017    Procedure: LAPAROSCOPIC CHOLECYSTECTOMY WITH CHOLANGIOGRAMS;  LAPAROSCOPIC CHOLECYSTECTOMY WITH  CHOLANGIOGRAMS;  Surgeon: Huma Pierre MD;  Location: [facility]     Left total knee replacement  11/19/2012    left - Degenerative  disc disease     Multi-level cervical spine fusion      Cervical spine multi-level fracture     Right knee cortisone injection  11/26/2012     total knee arthroplasty  2/15/2013    right - knee arthrosis, severe valgus       Social History     Tobacco Use     Smoking status: Never Smoker     Smokeless tobacco: Never Used     Tobacco comment: no passive smoke exposure   Substance Use Topics     Alcohol use: Yes     Comment: beer & wine rarely     Family History   Problem Relation Age of Onset     Colorectal Cancer Mother         mother passed in here 50's     Thyroid Disease No family hx of      Asthma No family hx of          Current Outpatient Medications   Medication Sig Dispense Refill     acetaminophen (TYLENOL) 325 MG tablet Take 1-2 tablets by mouth every 4 hours as needed.       ASPIRIN EC PO Take 162 mg by mouth daily       ANDERSON CONTOUR NEXT test strip USE TO TEST BLOOD SUGAR ONCE DAILY OR AS DIRECTED. 50 strip 6     Blood Glucose Monitoring Suppl (CONTOUR BLOOD GLUCOSE SYSTEM) w/Device KIT Test once daily 1 kit 0     CLEAR-ATADINE 10 MG tablet TAKE 1 TABLET (10 MG) BY MOUTH DAILY AS NEEDED 90 tablet 3     cyclobenzaprine (FLEXERIL) 5 MG tablet Take 1 tablet (5 mg) by mouth 3 times daily as needed for muscle spasms 30 tablet 0     cycloSPORINE (RESTASIS) 0.05 % ophthalmic emulsion Place 1 drop into both eyes 2 times daily       EASY TOUCH LANCETS 32G/TWIST MISC AS DIRECTED 100 each 2     glipiZIDE (GLUCOTROL XL) 10 MG 24 hr tablet Take 1 tablet (10 mg) by mouth daily 90 tablet 3     hydrochlorothiazide (HYDRODIURIL) 25 MG tablet Take 1 tablet (25 mg) by mouth daily as needed (edema) 90 tablet 3     omeprazole (PRILOSEC) 20 MG DR capsule Take 1 capsule (20 mg) by mouth daily as needed (reflux) 90 capsule 3     order for DME Incontinence pads, active style, up to 3 daily 100 Units prn     sertraline (ZOLOFT) 100 MG tablet Take 1 tablet (100 mg) by mouth daily 90 tablet 3     traZODone (DESYREL) 100 MG tablet  Take 1 tablet (100 mg) by mouth At Bedtime 90 tablet 3     Allergies   Allergen Reactions     Hydrocodone Bitartrate      Meperidine Hcl      Morphine Sulfate          Reviewed and updated as needed this visit by Provider  Tobacco  Allergies  Meds  Problems  Med Hx  Surg Hx  Fam Hx         Review of Systems   ROS COMP: Constitutional, HEENT, cardiovascular, pulmonary, gi and gu systems are negative, except as otherwise noted.      Objective    /80 (BP Location: Right arm, Patient Position: Sitting, Cuff Size: Adult Large)   Pulse 92   Temp 97  F (36.1  C) (Tympanic)   Resp 14   SpO2 97%   There is no height or weight on file to calculate BMI.  Physical Exam   GENERAL: healthy, alert and no distress  RESP: lungs clear to auscultation - no rales, rhonchi or wheezes  CV: regular rates and rhythm, normal S1 S2, no S3 or S4 and no murmur, click or rub  NEURO: Normal strength and tone, mentation intact and speech normal  PSYCH: mentation appears normal, affect normal/bright    Diagnostic Test Results:  ordered        Assessment & Plan     1. Type 2 diabetes mellitus without complication, without long-term current use of insulin (H)  Labs ordered and medications renewed.  Patient states she just ordered diabetic nutra-system, which she has done very well with in the past as far as BG control.  Follow-up in 6-12 months, depending on lab results.  - Blood Glucose Monitoring Suppl (CONTOUR BLOOD GLUCOSE SYSTEM) w/Device KIT; Test once daily  Dispense: 1 kit; Refill: 0  - Hemoglobin A1c  - Lipid Profile  - glipiZIDE (GLUCOTROL XL) 10 MG 24 hr tablet; Take 1 tablet (10 mg) by mouth daily  Dispense: 90 tablet; Refill: 3  - Estimated Average Glucose  - Estimated Average Glucose    2. Essential hypertension  Labs updated and medications ordered.  - Basic metabolic panel  - hydrochlorothiazide (HYDRODIURIL) 25 MG tablet; Take 1 tablet (25 mg) by mouth daily as needed (edema)  Dispense: 90 tablet; Refill: 3    3.  Moderate episode of recurrent major depressive disorder (H)  Continue current medications.  - sertraline (ZOLOFT) 100 MG tablet; Take 1 tablet (100 mg) by mouth daily  Dispense: 90 tablet; Refill: 3  - traZODone (DESYREL) 100 MG tablet; Take 1 tablet (100 mg) by mouth At Bedtime  Dispense: 90 tablet; Refill: 3    4. Gastroesophageal reflux disease, esophagitis presence not specified  As above.  - omeprazole (PRILOSEC) 20 MG DR capsule; Take 1 capsule (20 mg) by mouth daily as needed (reflux)  Dispense: 90 capsule; Refill: 3    5. Arthropathy  As above.  - cyclobenzaprine (FLEXERIL) 5 MG tablet; Take 1 tablet (5 mg) by mouth 3 times daily as needed for muscle spasms  Dispense: 30 tablet; Refill: 0         Return in about 6 months (around 1/9/2020) for Chronic Disease Management.    Jennifer Bowling MD  [facility]

## 2019-07-09 ENCOUNTER — OFFICE VISIT (OUTPATIENT)
Dept: FAMILY MEDICINE | Facility: OTHER | Age: 69
End: 2019-07-09
Attending: FAMILY MEDICINE
Payer: COMMERCIAL

## 2019-07-09 VITALS
SYSTOLIC BLOOD PRESSURE: 128 MMHG | HEART RATE: 92 BPM | OXYGEN SATURATION: 97 % | RESPIRATION RATE: 14 BRPM | TEMPERATURE: 97 F | DIASTOLIC BLOOD PRESSURE: 80 MMHG

## 2019-07-09 DIAGNOSIS — E11.9 TYPE 2 DIABETES MELLITUS WITHOUT COMPLICATION, WITHOUT LONG-TERM CURRENT USE OF INSULIN (H): Primary | ICD-10-CM

## 2019-07-09 DIAGNOSIS — F33.1 MODERATE EPISODE OF RECURRENT MAJOR DEPRESSIVE DISORDER (H): ICD-10-CM

## 2019-07-09 DIAGNOSIS — M12.9 ARTHROPATHY: ICD-10-CM

## 2019-07-09 DIAGNOSIS — I10 ESSENTIAL HYPERTENSION: ICD-10-CM

## 2019-07-09 DIAGNOSIS — K21.9 GASTROESOPHAGEAL REFLUX DISEASE, ESOPHAGITIS PRESENCE NOT SPECIFIED: ICD-10-CM

## 2019-07-09 PROCEDURE — 99214 OFFICE O/P EST MOD 30 MIN: CPT | Performed by: FAMILY MEDICINE

## 2019-07-09 PROCEDURE — G0463 HOSPITAL OUTPT CLINIC VISIT: HCPCS

## 2019-07-09 PROCEDURE — 36415 COLL VENOUS BLD VENIPUNCTURE: CPT | Mod: ZL | Performed by: FAMILY MEDICINE

## 2019-07-09 PROCEDURE — 83036 HEMOGLOBIN GLYCOSYLATED A1C: CPT | Mod: ZL | Performed by: FAMILY MEDICINE

## 2019-07-09 PROCEDURE — 40000788 ZZHCL STATISTIC ESTIMATED AVERAGE GLUCOSE: Mod: ZL | Performed by: FAMILY MEDICINE

## 2019-07-09 PROCEDURE — 80061 LIPID PANEL: CPT | Mod: ZL | Performed by: FAMILY MEDICINE

## 2019-07-09 PROCEDURE — 80048 BASIC METABOLIC PNL TOTAL CA: CPT | Mod: ZL | Performed by: FAMILY MEDICINE

## 2019-07-09 RX ORDER — SERTRALINE HYDROCHLORIDE 100 MG/1
100 TABLET, FILM COATED ORAL DAILY
Qty: 90 TABLET | Refills: 3 | Status: SHIPPED | OUTPATIENT
Start: 2019-07-09 | End: 2020-01-01

## 2019-07-09 RX ORDER — CYCLOBENZAPRINE HCL 5 MG
5 TABLET ORAL 3 TIMES DAILY PRN
Qty: 30 TABLET | Refills: 0 | Status: SHIPPED | OUTPATIENT
Start: 2019-07-09

## 2019-07-09 RX ORDER — BLOOD-GLUCOSE METER
KIT MISCELLANEOUS
Qty: 1 KIT | Refills: 0 | Status: SHIPPED | OUTPATIENT
Start: 2019-07-09 | End: 2019-11-02

## 2019-07-09 RX ORDER — GLIPIZIDE 10 MG/1
10 TABLET, FILM COATED, EXTENDED RELEASE ORAL DAILY
Qty: 90 TABLET | Refills: 3 | Status: SHIPPED | OUTPATIENT
Start: 2019-07-09 | End: 2020-01-01

## 2019-07-09 RX ORDER — HYDROCHLOROTHIAZIDE 25 MG/1
25 TABLET ORAL DAILY PRN
Qty: 90 TABLET | Refills: 3 | Status: SHIPPED | OUTPATIENT
Start: 2019-07-09 | End: 2020-01-01

## 2019-07-09 RX ORDER — TRAZODONE HYDROCHLORIDE 100 MG/1
100 TABLET ORAL AT BEDTIME
Qty: 90 TABLET | Refills: 3 | Status: SHIPPED | OUTPATIENT
Start: 2019-07-09

## 2019-07-09 ASSESSMENT — PAIN SCALES - GENERAL: PAINLEVEL: SEVERE PAIN (6)

## 2019-07-09 NOTE — NURSING NOTE
"Chief Complaint   Patient presents with     Diabetes     Anxiety     Depression     Hypertension       Initial /80 (BP Location: Right arm, Patient Position: Sitting, Cuff Size: Adult Large)   Pulse 92   Temp 97  F (36.1  C) (Tympanic)   Resp 14   SpO2 97%  Estimated body mass index is 31.26 kg/m  as calculated from the following:    Height as of 10/11/18: 1.638 m (5' 4.5\").    Weight as of 10/11/18: 83.9 kg (185 lb).  Medication Reconciliation: willam Sotelo      "

## 2019-07-10 LAB
ANION GAP SERPL CALCULATED.3IONS-SCNC: 8 MMOL/L (ref 3–14)
BUN SERPL-MCNC: 15 MG/DL (ref 7–30)
CALCIUM SERPL-MCNC: 10.1 MG/DL (ref 8.5–10.1)
CHLORIDE SERPL-SCNC: 107 MMOL/L (ref 94–109)
CHOLEST SERPL-MCNC: 172 MG/DL
CO2 SERPL-SCNC: 23 MMOL/L (ref 20–32)
CREAT SERPL-MCNC: 0.88 MG/DL (ref 0.52–1.04)
EST. AVERAGE GLUCOSE BLD GHB EST-MCNC: 157 MG/DL
GFR SERPL CREATININE-BSD FRML MDRD: 66 ML/MIN/{1.73_M2}
GLUCOSE SERPL-MCNC: 131 MG/DL (ref 70–99)
HBA1C MFR BLD: 7.1 % (ref 0–5.6)
HDLC SERPL-MCNC: 48 MG/DL
LDLC SERPL CALC-MCNC: 103 MG/DL
NONHDLC SERPL-MCNC: 124 MG/DL
POTASSIUM SERPL-SCNC: 4.4 MMOL/L (ref 3.4–5.3)
SODIUM SERPL-SCNC: 138 MMOL/L (ref 133–144)
TRIGL SERPL-MCNC: 107 MG/DL

## 2019-07-12 ASSESSMENT — PATIENT HEALTH QUESTIONNAIRE - PHQ9: SUM OF ALL RESPONSES TO PHQ QUESTIONS 1-9: 10

## 2019-10-14 ENCOUNTER — TELEPHONE (OUTPATIENT)
Dept: FAMILY MEDICINE | Facility: OTHER | Age: 69
End: 2019-10-14

## 2019-10-14 DIAGNOSIS — R50.9 FEVER, UNSPECIFIED FEVER CAUSE: Primary | ICD-10-CM

## 2019-10-14 DIAGNOSIS — R50.9 FEVER, UNSPECIFIED FEVER CAUSE: ICD-10-CM

## 2019-10-14 DIAGNOSIS — R82.79 ABNORMAL FINDINGS ON MICROBIOLOGICAL EXAMINATION OF URINE: Primary | ICD-10-CM

## 2019-10-14 DIAGNOSIS — N39.0 URINARY TRACT INFECTION WITHOUT HEMATURIA, SITE UNSPECIFIED: Primary | ICD-10-CM

## 2019-10-14 LAB
ALBUMIN UR-MCNC: 100 MG/DL
APPEARANCE UR: ABNORMAL
BACTERIA #/AREA URNS HPF: ABNORMAL /HPF
BILIRUB UR QL STRIP: ABNORMAL
COLOR UR AUTO: YELLOW
GLUCOSE UR STRIP-MCNC: NEGATIVE MG/DL
HGB UR QL STRIP: ABNORMAL
KETONES UR STRIP-MCNC: NEGATIVE MG/DL
LEUKOCYTE ESTERASE UR QL STRIP: ABNORMAL
NITRATE UR QL: POSITIVE
NON-SQ EPI CELLS #/AREA URNS LPF: ABNORMAL /LPF
PH UR STRIP: 6 PH (ref 5–7)
RBC #/AREA URNS AUTO: ABNORMAL /HPF
SOURCE: ABNORMAL
SP GR UR STRIP: 1.02 (ref 1–1.03)
UROBILINOGEN UR STRIP-ACNC: 1 EU/DL (ref 0.2–1)
WBC #/AREA URNS AUTO: >100 /HPF

## 2019-10-14 PROCEDURE — 87186 SC STD MICRODIL/AGAR DIL: CPT | Mod: ZL | Performed by: FAMILY MEDICINE

## 2019-10-14 PROCEDURE — 87086 URINE CULTURE/COLONY COUNT: CPT | Mod: ZL | Performed by: FAMILY MEDICINE

## 2019-10-14 PROCEDURE — 81001 URINALYSIS AUTO W/SCOPE: CPT | Mod: ZL | Performed by: FAMILY MEDICINE

## 2019-10-14 PROCEDURE — 87088 URINE BACTERIA CULTURE: CPT | Mod: ZL | Performed by: FAMILY MEDICINE

## 2019-10-14 RX ORDER — CIPROFLOXACIN 250 MG/1
250 TABLET, FILM COATED ORAL 2 TIMES DAILY
Qty: 14 TABLET | Refills: 0 | Status: SHIPPED | OUTPATIENT
Start: 2019-10-14 | End: 2019-10-21

## 2019-10-14 NOTE — TELEPHONE ENCOUNTER
8:23 AM    Reason for Call: Phone Call    Description: Pt had had headache & fever for 4 days. She would like to know if she could have someone come in and  a sterile container and then have them drop off a specimen if Dr. Bowling would do a order for it.    Was an appointment offered for this call? No  If yes : Appointment type              Date    Preferred method for responding to this message: Telephone Call  What is your phone number ? [phone]- this is a phone number just for today (her regular numbers are to stay the same on her regular info)    If we cannot reach you directly, may we leave a detailed response at the number you provided? Yes    Can this message wait until your PCP/provider returns, if available today? Not applicable, provider is in today    Brooke Meneses

## 2019-10-16 LAB
BACTERIA SPEC CULT: ABNORMAL
SPECIMEN SOURCE: ABNORMAL

## 2019-11-02 DIAGNOSIS — E11.9 TYPE 2 DIABETES MELLITUS WITHOUT COMPLICATION, WITHOUT LONG-TERM CURRENT USE OF INSULIN (H): ICD-10-CM

## 2019-12-10 NOTE — NURSING NOTE
"Chief Complaint   Patient presents with     Musculoskeletal Problem     script for her percocet     gall bladder     concerns discuss surgery     Diabetes     follow up may need additional labwork       Initial /86 (BP Location: Left arm, Patient Position: Chair, Cuff Size: Adult Large)  Pulse 81  Temp 97.2  F (36.2  C) (Tympanic)  Resp 15  Ht 5' 4.5\" (1.638 m)  Wt 177 lb (80.3 kg)  SpO2 98%  BMI 29.91 kg/m2 Estimated body mass index is 29.91 kg/(m^2) as calculated from the following:    Height as of this encounter: 5' 4.5\" (1.638 m).    Weight as of this encounter: 177 lb (80.3 kg).  Medication Reconciliation: complete     Roomed by Erin Ludwig LPN    " Subjective   Patient ID: Francisco is a 14 year old male who is accompanied by:mother     Well Child Assessment:  History provided by: patient. Francisco lives with his mother and father.   Nutrition  Types of intake include cow's milk, eggs, meats and vegetables.   Dental  The patient has a dental home. The patient brushes teeth regularly.   Elimination  Elimination problems do not include constipation or diarrhea.   Sleep  The patient does not snore. There are no sleep problems.   Safety  There is no smoking in the home. Home has working smoke alarms? yes. Home has working carbon monoxide alarms? yes.   School  Current grade level is 12th. There are no signs of learning disabilities. Child is doing well in school.   Screening  There are no risk factors for anemia. There are risk factors for dyslipidemia. There are no risk factors for vision problems. There are risk factors related to diet. There are no risk factors for sexually transmitted infections. There are no risk factors related to alcohol. There are no risk factors related to relationships. There are no risk factors related to friends or family. There are no risk factors related to drugs. There are no risk factors related to personal safety. There are risk factors related to special circumstances.   Social  The caregiver enjoys the child. After school, the child is at home with a parent.   Moving to [place] in two weeks    HPI  Additional concerns today: anxiety about moving,has seen a therapist and counselor at school\    Review of Systems   Constitutional: Negative for fatigue and fever.   HENT: Negative for ear discharge, ear pain, rhinorrhea and sore throat.    Eyes: Negative for discharge and visual disturbance.   Respiratory: Negative for snoring, cough and shortness of breath.    Cardiovascular: Negative for chest pain and palpitations.   Gastrointestinal: Negative for abdominal pain, constipation and diarrhea.   Endocrine: Negative.    Genitourinary:  Negative for difficulty urinating and dysuria.   Musculoskeletal: Negative for arthralgias and joint swelling.   Skin: Negative for rash.   Neurological: Negative for dizziness, weakness and headaches.   Hematological: Negative for adenopathy.   Psychiatric/Behavioral: Negative for behavioral problems and sleep disturbance.       Patient's medications, allergies, past medical, surgical, social and family histories were reviewed and updated as appropriate.    Objective   Vitals: /74 (BP Location: LUE - Left upper extremity, Patient Position: Sitting, Cuff Size: Regular)   Pulse 58   Temp 97.8 °F (36.6 °C) (Temporal)   Ht 5' 7.32\" (1.71 m)   Wt 82.1 kg (180 lb 14.4 oz)   BMI 28.06 kg/m²   BSA 1.94 m²   Growth parameters are noted and are appropriate for age.    Physical Exam  Vitals signs and nursing note reviewed.   Constitutional:       General: He is not in acute distress.     Appearance: He is well-developed.   HENT:      Right Ear: External ear normal.      Left Ear: External ear normal.      Mouth/Throat:      Pharynx: No oropharyngeal exudate.   Neck:      Musculoskeletal: Neck supple.   Cardiovascular:      Rate and Rhythm: Normal rate and regular rhythm.      Heart sounds: Normal heart sounds. No murmur.   Pulmonary:      Effort: Pulmonary effort is normal.      Breath sounds: Normal breath sounds. No wheezing or rales.   Abdominal:      Palpations: Abdomen is soft. There is no mass.      Tenderness: There is no tenderness.   Genitourinary:     Penis: Normal.       Scrotum/Testes: Normal.   Musculoskeletal: Normal range of motion.   Lymphadenopathy:      Cervical: No cervical adenopathy.   Skin:     General: Skin is warm.   Neurological:      Mental Status: He is alert and oriented to person, place, and time.       Louie Stage 4    Assessment   Problem List Items Addressed This Visit     None      Visit Diagnoses     Well adolescent visit    -  Primary    Relevant Orders    CBC WITH DIFFERENTIAL     COMPREHENSIVE METABOLIC PANEL    GLYCOHEMOGLOBIN    VITAMIN D -25 HYDROXY    LIPID PANEL WITHOUT REFLEX    Need for vaccination        Relevant Orders    HPV 9 VALENT VACC (Completed)    INFLUENZA QUADRIVALENT SPLIT PRES FREE 0.5 ML VACC, IM (FLULAVAL,FLUARIX,FLUZONE) (Completed)    MENINGOCOCCAL CONJUGATE MCV4P VACC (MENACTRA) (Completed)    MENINGOCOCCAL SEROGROUP B RECOMBINANT VACC 2 DOSE (BEXSERO) (Completed)    Need for lipid screening        Relevant Orders    LIPID PANEL WITHOUT REFLEX    Vitamin D deficiency        Relevant Orders    VITAMIN D -25 HYDROXY    BMI (body mass index), pediatric, 95-99% for age        Relevant Orders    GLYCOHEMOGLOBIN    LIPID PANEL WITHOUT REFLEX          Counseling  Nutrition/Weight Management:  Assessment and discussion of current Nutrition behaviors performed:  Yes  Assessment and discussion of current Physical Activity behaviors performed: Yes    Immunizations: per orders.  History of previous adverse reactions to immunizations? no  Immunizations given today: Yes - Immunizations given today and vaccine counseling including benefits, risks, and adverse reactions were provided by myself during the visit.    Follow-up yearly for a well visit, or sooner as needed.  Advised to see psychiatry to help with situational anxiety if counseling sessions are not helping  CRAFT and PHQ 9 administered and discussed.  PHQ Score: 0  CRAFT: neg/low score

## 2019-12-16 NOTE — ED AVS SNAPSHOT
[facility]  [address]  [place] [address]  Phone:  [phone]                                    Nilda Ramirez   MRN: [id number]    Department:  [facility]   Date of Visit:  12/16/2019           After Visit Summary Signature Page    I have received my discharge instructions, and my questions have been answered. I have discussed any challenges I see with this plan with the nurse or doctor.    ..........................................................................................................................................  Patient/Patient Representative Signature      ..........................................................................................................................................  Patient Representative Print Name and Relationship to Patient    ..................................................               ................................................  Date                                   Time    ..........................................................................................................................................  Reviewed by Signature/Title    ...................................................              ..............................................  Date                                               Time          22EPIC Rev 08/18

## 2019-12-16 NOTE — ED NOTES
"Pt to ED room 11 by [facility] from home after falling backwards and landing on her butt then hitting her head on stairs that lead up to her bed. Pt states that this occurred 12/12 \"late at night.\" Pt states that she was not evaluated but called the clinic today and was told to call 911. Pt c/o back/neck pain, bilateral hip pain, and a laceration to the back of head. No laceration noted. Pt states that she does not take blood thinners. Pelvis is stable and no bruising noted to hips. Bruising noted to left lower arm. Pt states that she has not taken anything for the pain. Pt does have a chronic left facial droop, which is not new to her. Pt states \"it is from surgical error.\" negative BEFAST.   "

## 2019-12-16 NOTE — ED TRIAGE NOTES
Per EMS and Pt, pt fell Thursday night when she was getting into her [facility] bed that has steps. She landed on her buttocks and head hit something and has a laceration on posterior head with dried blood present. Hx of benign brain tumor, Diabetic, has a baseline left facial droop. Recently completed a course of antibiotics for UTI. Has had bilat hip pain since the fall. Now states her chronic neck/back pain is increased since then.

## 2019-12-16 NOTE — ED PROVIDER NOTES
History     Chief Complaint   Patient presents with     Generalized Weakness     slipped off step to floor a few days ago. Has generalized weakness and bilateral hip pain since.     HPI  Nilda Ramirez is a 69 year old female who presents emergency department via ambulance after fall from her bed on Thursday.  Patient states she has a high Victorian bed and fell off backwards striking the back of her head resulting in laceration and a bump.  Patient states she is unsure if she lost consciousness before or after the fall but believes she was unconscious for period of time.  Patient states she has been unable to walk since then.  She relates that she did have an episode of vomiting  once after the fall but not since.  She reports having mild headache.  Patient also reports having bilateral hip pain since the fall.  Patient has shortness of breath which she states has been an ongoing problem for the last couple months.  She denies chest pain, cough, fever.  Patient states she was recently treated for urinary tract infection and denies any urinary symptoms at this time.  Patient states she laid on the floor since the fall and her roommate also attests to this.      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     Priority: Medium     Hypermetropia 05/30/2012     Priority: Medium     Presbyopia 05/30/2012     Priority: Medium     Type 2 diabetes mellitus without complication, without long-term current use of insulin (H) 05/04/2012     Priority: Medium     Benign neoplasm of cranial nerve (H)  05/04/2012     Priority: Medium     Schwannoma of cranial nerve (H) 05/04/2012     Priority: Medium     Closed fracture of cervical vertebra (H) 05/04/2012     Priority: Medium     Anemia 05/04/2012     Priority: Medium     Anxiety state 05/04/2012     Priority: Medium     Arthropathy 05/04/2012     Priority: Medium     History of pulmonary embolism 05/04/2012     Priority: Medium     with DVT in 2000       Major depressive disorder, recurrent episode (H) 05/04/2012     Priority: Medium     Esophageal reflux 05/04/2012     Priority: Medium     Essential hypertension 05/04/2012     Priority: Medium     History of disease of skin and subcutaneous tissue 05/04/2012     Priority: Medium        Past Medical History:    Past Medical History:   Diagnosis Date     Acoustic neuroma 5/4/2012     Anemia 5/4/2012     Anxiety state, unspecified 5/4/2012     Arthritis 5/4/2012     Cervical spine fracture 5/4/2012     Depressive disorder      Diabetes mellitus without mention of complication, 5/4/2012     Esophageal reflux 5/4/2012     History of pulmonary embolism 5/4/2012     History of rectal abscess 5/4/2012     Insomnia 12/29/2014     Major depressive affective disorder, recurrent epi 5/4/2012     Schwannoma 5/4/2012     Unspecified essential hypertension 5/4/2012       Past Surgical History:    Past Surgical History:   Procedure Laterality Date     ARTHROSCOPY KNEE  1996    bilat total knees     BACK SURGERY  2006     BIOPSY BREAST  2009     BIOPSY BREAST  2010     BRAIN TUMOR RESECTION  2000    Acoustic neuroma/schwanoma     COLONOSCOPY  2007    repeat 10 yrs     D&C  1990     deafness in left ear       knee replacement  2/2013    right     LAPAROSCOPIC CHOLECYSTECTOMY WITH CHOLANGIOGRAMS N/A 8/7/2017    Procedure: LAPAROSCOPIC CHOLECYSTECTOMY WITH CHOLANGIOGRAMS;  LAPAROSCOPIC CHOLECYSTECTOMY WITH  CHOLANGIOGRAMS;  Surgeon: Huma Pierre MD;  Location: [facility]     Left total knee replacement  11/19/2012    left -  "Degenerative disc disease     Multi-level cervical spine fusion      Cervical spine multi-level fracture     Right knee cortisone injection  11/26/2012     total knee arthroplasty  2/15/2013    right - knee arthrosis, severe valgus       Family History:    Family History   Problem Relation Age of Onset     Colorectal Cancer Mother         mother passed in here 50's     Thyroid Disease No family hx of      Asthma No family hx of        Social History:  Marital Status:  Single [1]  Social History     Tobacco Use     Smoking status: Never Smoker     Smokeless tobacco: Never Used     Tobacco comment: no passive smoke exposure   Substance Use Topics     Alcohol use: Yes     Comment: beer & wine rarely     Drug use: No        Medications:    cephALEXin (KEFLEX) 500 MG capsule  acetaminophen (TYLENOL) 325 MG tablet  ASPIRIN EC PO  ANDERSON CONTOUR NEXT test strip  CLEAR-ATADINE 10 MG tablet  CONTOUR NEXT TEST test strip  cyclobenzaprine (FLEXERIL) 5 MG tablet  cycloSPORINE (RESTASIS) 0.05 % ophthalmic emulsion  EASY TOUCH LANCETS 32G/TWIST MISC  glipiZIDE (GLUCOTROL XL) 10 MG 24 hr tablet  hydrochlorothiazide (HYDRODIURIL) 25 MG tablet  omeprazole (PRILOSEC) 20 MG DR capsule  order for DME  sertraline (ZOLOFT) 100 MG tablet  traZODone (DESYREL) 100 MG tablet          Review of Systems   Constitutional: Negative for fatigue and fever.   Eyes: Negative.  Negative for visual disturbance.   Respiratory: Negative.    Cardiovascular: Negative.    Gastrointestinal: Negative.    Genitourinary: Negative.    Musculoskeletal: Positive for gait problem. Negative for neck pain.        Positive for bilateral hip pain.   Neurological: Negative for seizures, speech difficulty, numbness and headaches.        Positive for bilateral lower extremity weakness and LOC 4 days ago.   Hematological: Negative.        Physical Exam   BP: 140/93  Pulse: 77  Temp: 98.6  F (37  C)  Height: 163.8 cm (5' 4.5\")  Weight: 77.1 kg (170 lb)(stated)  SpO2: 97 " %      Physical Exam  Constitutional:       General: She is not in acute distress.     Appearance: Normal appearance. She is not diaphoretic.   HENT:      Head:      Comments: Moderate sized contusion in the occipital region with some dried blood.       Right Ear: Tympanic membrane and ear canal normal.      Left Ear: Tympanic membrane and ear canal normal.      Mouth/Throat:      Pharynx: No oropharyngeal exudate or posterior oropharyngeal erythema.   Eyes:      General: No scleral icterus.     Extraocular Movements: Extraocular movements intact.      Conjunctiva/sclera: Conjunctivae normal.      Pupils: Pupils are equal, round, and reactive to light.   Neck:      Musculoskeletal: Normal range of motion. No muscular tenderness.   Cardiovascular:      Heart sounds: Normal heart sounds.   Pulmonary:      Effort: No respiratory distress.      Breath sounds: Normal breath sounds.   Abdominal:      General: Bowel sounds are normal.      Palpations: Abdomen is soft.      Tenderness: There is no abdominal tenderness.   Musculoskeletal:         General: No tenderness.   Skin:     General: Skin is warm.      Findings: No rash.   Neurological:      Mental Status: She is alert and oriented to person, place, and time.      Cranial Nerves: No cranial nerve deficit.      Sensory: No sensory deficit.      Motor: Weakness present.      Gait: Gait abnormal.      Comments: Patient able to lift each leg up but is unable to lift against mild pressure.  Patient able to dorsiflex and plantarflex against resistance.         ED Course        Procedures          No evidence of hip fracture.  CT head normal.  Patient reports having weakness to the point where she is unable to stand and was on the floor for 3 days which seems unlikely given she has a roommate but both insist this is true.  MR lumbar spine essentially unchanged from 2017 however some perinephric stranding was noted and radiologist recommended f/u CT which indicated additional  findings requiring f/u with urology.  See report for details.           Results for orders placed or performed during the hospital encounter of 12/16/19 (from the past 24 hour(s))   Basic metabolic panel   Result Value Ref Range    Sodium 138 133 - 144 mmol/L    Potassium 3.0 (L) 3.4 - 5.3 mmol/L    Chloride 103 94 - 109 mmol/L    Carbon Dioxide 25 20 - 32 mmol/L    Anion Gap 10 3 - 14 mmol/L    Glucose 143 (H) 70 - 99 mg/dL    Urea Nitrogen 18 7 - 30 mg/dL    Creatinine 0.64 0.52 - 1.04 mg/dL    GFR Estimate 90 >60 mL/min/[1.73_m2]    GFR Estimate If Black >90 >60 mL/min/[1.73_m2]    Calcium 9.2 8.5 - 10.1 mg/dL   CBC with platelets differential   Result Value Ref Range    WBC 10.5 4.0 - 11.0 10e9/L    RBC Count 4.77 3.8 - 5.2 10e12/L    Hemoglobin 13.7 11.7 - 15.7 g/dL    Hematocrit 41.1 35.0 - 47.0 %    MCV 86 78 - 100 fl    MCH 28.7 26.5 - 33.0 pg    MCHC 33.3 31.5 - 36.5 g/dL    RDW 13.2 10.0 - 15.0 %    Platelet Count 243 150 - 450 10e9/L    Diff Method Automated Method     % Neutrophils 69.3 %    % Lymphocytes 19.6 %    % Monocytes 7.6 %    % Eosinophils 2.4 %    % Basophils 0.5 %    % Immature Granulocytes 0.6 %    Nucleated RBCs 0 0 /100    Absolute Neutrophil 7.3 1.6 - 8.3 10e9/L    Absolute Lymphocytes 2.1 0.8 - 5.3 10e9/L    Absolute Monocytes 0.8 0.0 - 1.3 10e9/L    Absolute Eosinophils 0.3 0.0 - 0.7 10e9/L    Absolute Basophils 0.1 0.0 - 0.2 10e9/L    Abs Immature Granulocytes 0.1 0 - 0.4 10e9/L    Absolute Nucleated RBC 0.0    Troponin I   Result Value Ref Range    Troponin I ES <0.015 0.000 - 0.045 ug/L   TSH with free T4 reflex   Result Value Ref Range    TSH 1.50 0.40 - 4.00 mU/L   CT Head w/o Contrast    Narrative    PROCEDURE: CT HEAD W/O CONTRAST   12/16/2019 3:04 PM    HISTORY:Female, age,  69 years, , , fall out of bed on Thurs, hit  head, possible syncope prior to fall.    COMPARISON: MRI brain 10/18/2018    TECHNIQUE: CT of the brain without contrast.    FINDINGS: Ventricles and sulci are  mildly prominent in size and shape.  Gray and white matter demonstrate scattered areas of decreased  density.    There is no evidence of mass, mass effect or midline shift. No  evidence of acute hemorrhage.    The bones are unremarkable. No fracture.       Impression    IMPRESSION:   No evidence of acute intracranial hemorrhage.  No acute fracture.     Mild generalized atrophy.    Nonspecific white matter changes suggesting small vessel disease.    WARREN LEW MD   XR Hip G/E 2 Views Bilateral    Narrative    XR HIP BILATERAL 2 VIEWS EACH, 12/16/2019 3:20 PM    History: Female, age 69 years; fall on Thurs.  cecily hip pain.    Comparison: None.    Technique:  2 views of each hip were obtained.    FINDINGS:  The bony pelvis and visualized portions of the hip appear  grossly intact. No evidence of fracture no evidence of dislocation.   Mild  degenerative changes.      Impression    IMPRESSION: No distinct evidence of acute or subacute abnormality.     Mild bilateral hip joint degenerative change.    Visualized portions of the bony pelvis appear intact.    Suggestion of a bone island in the left femoral neck. Cannot exclude  the possibility of a calcified joint body.    WARREN LEW MD   Lumbar spine MRI w/o contrast    Narrative    MR LUMBAR SPINE W/O CONTRAST    HISTORY: lower extremity weakness, history of spinal canal stenosis..  .     TECHNIQUE: Sagittal T1, T2 and STIR as well as axial T2 images of the  lumbar spine were obtained.    COMPARISON: 5/26/17.    FINDINGS:      There is degenerative trace anterolisthesis of L4 on L5, unchanged.  Lumbar lordosis is otherwise preserved.  The vertebral body heights  are preserved.  The marrow signal is unchanged.    The distal cord and conus medullaris have a normal caliber and  morphology.  The conus terminates at L1-2.  No abnormal cord signal is  seen in the distal cord or conus. The paraspinal soft tissues are  notable for new left collecting system fullness and  perinephric  stranding.    At L1-2, the central spinal canal and neural foramina are patent.    At L2-3, there is mild facet degenerative hypertrophy. The central  spinal canal and neural foramina are patent.    At L3-4, there is a moderate asymmetric right disc bulge with moderate  to severe facet degenerative hypertrophy. A shallow inferior posterior  left L3-4 synovial cyst is again suggested. Spinal stenosis is severe.  There is again redundancy of the cauda equina nerve roots. Foraminal  stenoses are moderate.    At L4-5, there is uncovering of the disc, a moderate symmetric disc  bulge and severe facet degenerative hypertrophy. Spinal stenosis is  moderate. Foraminal stenoses are moderate.    At L5-S1, there is moderate facet degenerative hypertrophy. The  central spinal canal and neural foramina are patent.      Impression    IMPRESSION:    Chronic severe spinal stenosis at L3-4. Other unchanged degenerative  disease as described above.    New left perinephric stranding and possible hydronephrosis. Recommend  follow-up CT abdomen pelvis.    VIVIEN RAMIREZ MD   CT Abdomen Pelvis w/o Contrast    Narrative    PROCEDURE:  CT ABDOMEN PELVIS W/O CONTRAST    HISTORY:  perinephric stranding noted on Lumbar MR    TECHNIQUE:  Helical CT of the abdomen and pelvis was performed without  intravenous contrast.    COMPARISON:  MR earlier tonight.    FINDINGS:      Evaluation of the solid organs is somewhat limited due to the lack of  intravenous contrast.    Limited views through the lung bases show no focal consolidation or  intrapulmonary mass.     The previously question left perinephric fluid and stranding is  confirmed, some of which appears relatively and its. There is mild  left calyceal fullness. No collecting system calculus is identified on  either side. No left 11th or 12th rib fracture is identified.    The gallbladder is surgically absent. The noncontrast appearance of  the pancreas, spleen and adrenal  glands is within normal limits. An  IVC filter is in place. The bowel is normal in caliber. The appendix  is normal.    Soft tissue density lesions are present adjacent to the external iliac  vessels, approximating the origins of the inguinal canals, spanning up  to 4.9 cm on the left 4.6 cm on the right. A 1.2 cm short axis left  external iliac node is mildly enlarged. Small scattered mesenteric  nodes are nonspecific.    No suspicious osseous lesions are identified.      Impression    IMPRESSION:      Left perinephric fluid and stranding. Some of the fluid appears  hyperdense, raising the possibility of hemorrhage. Mild left calyceal  fullness without lashaun hydronephrosis. No evidence of renal stone.    Soft tissue masses along the external iliac arteries near the origins  of the inguinal canals. Absent a known history of bilateral inguinal  hernia repair with mesh placement, pathologic adenopathy is most  likely.    Recommend follow-up postcontrast CT imaging of the abdomen and pelvis  including nephrographic and delayed urographic phases.    VIVIEN RAMIREZ MD   UA reflex to Microscopic and Culture   Result Value Ref Range    Color Urine Yellow     Appearance Urine Clear     Glucose Urine Negative NEG^Negative mg/dL    Bilirubin Urine Small (A) NEG^Negative    Ketones Urine 40 (A) NEG^Negative mg/dL    Specific Gravity Urine 1.028 1.003 - 1.035    Blood Urine Negative NEG^Negative    pH Urine 6.0 4.7 - 8.0 pH    Protein Albumin Urine 30 (A) NEG^Negative mg/dL    Urobilinogen mg/dL 4.0 (H) 0.0 - 2.0 mg/dL    Nitrite Urine Negative NEG^Negative    Leukocyte Esterase Urine Trace (A) NEG^Negative    Source Catheterized Urine     RBC Urine <1 0 - 2 /HPF    WBC Urine 2 0 - 5 /HPF    Bacteria Urine None (A) NEG^Negative /HPF    Squamous Epithelial /HPF Urine 7 (H) 0 - 1 /HPF    Mucous Urine Present (A) NEG^Negative /LPF    Hyaline Casts 3 (A) OTO2^O - 2 /LPF       Medications   cephALEXin (KEFLEX) capsule 500 mg  (has no administration in time range)       Assessments & Plan (with Medical Decision Making)     I have reviewed the nursing notes.    I have reviewed the findings, diagnosis, plan and need for follow up with the patient.    New Prescriptions    CEPHALEXIN (KEFLEX) 500 MG CAPSULE    Take 1 capsule (500 mg) by mouth 2 times daily for 7 days       Final diagnoses:   Weakness of both lower extremities   Hydronephrosis, unspecified hydronephrosis type   Malodorous urine     NURIS Akers on 12/16/2019 at 9:08 PM   12/16/2019   [facility]     Nikita Womack PA  12/16/19 2100       Nikita Womack PA  12/16/19 [id number]

## 2019-12-17 NOTE — ED NOTES
Discharge teaching done, AVS reviewed with pt, questions answered. Pt given hard copy of Keflex prescription. Pt verbalized understanding and is agreeable with discharge plan. Pt discharged to home accompanied by her friend.

## 2020-01-01 ENCOUNTER — APPOINTMENT (OUTPATIENT)
Dept: MRI IMAGING | Facility: HOSPITAL | Age: 70
End: 2020-01-01
Attending: EMERGENCY MEDICINE
Payer: COMMERCIAL

## 2020-01-01 ENCOUNTER — APPOINTMENT (OUTPATIENT)
Dept: GENERAL RADIOLOGY | Facility: HOSPITAL | Age: 70
End: 2020-01-01
Attending: EMERGENCY MEDICINE
Payer: COMMERCIAL

## 2020-01-01 ENCOUNTER — PATIENT OUTREACH (OUTPATIENT)
Dept: ONCOLOGY | Facility: OTHER | Age: 70
End: 2020-01-01

## 2020-01-01 ENCOUNTER — HOSPITAL PATHOLOGY (OUTPATIENT)
Dept: OTHER | Facility: CLINIC | Age: 70
End: 2020-01-01

## 2020-01-01 ENCOUNTER — TELEPHONE (OUTPATIENT)
Dept: FAMILY MEDICINE | Facility: OTHER | Age: 70
End: 2020-01-01

## 2020-01-01 ENCOUNTER — DOCUMENTATION ONLY (OUTPATIENT)
Dept: INTERVENTIONAL RADIOLOGY/VASCULAR | Facility: HOSPITAL | Age: 70
End: 2020-01-01

## 2020-01-01 ENCOUNTER — HOSPITAL ENCOUNTER (OUTPATIENT)
Facility: HOSPITAL | Age: 70
Discharge: HOME | End: 2020-09-03
Attending: RADIOLOGY | Admitting: RADIOLOGY
Payer: COMMERCIAL

## 2020-01-01 ENCOUNTER — ONCOLOGY VISIT (OUTPATIENT)
Dept: ONCOLOGY | Facility: OTHER | Age: 70
End: 2020-01-01
Attending: FAMILY MEDICINE
Payer: COMMERCIAL

## 2020-01-01 ENCOUNTER — VIRTUAL VISIT (OUTPATIENT)
Dept: FAMILY MEDICINE | Facility: OTHER | Age: 70
End: 2020-01-01
Attending: FAMILY MEDICINE
Payer: COMMERCIAL

## 2020-01-01 ENCOUNTER — PRE VISIT (OUTPATIENT)
Facility: CLINIC | Age: 70
End: 2020-01-01

## 2020-01-01 ENCOUNTER — TELEPHONE (OUTPATIENT)
Dept: INTERVENTIONAL RADIOLOGY/VASCULAR | Facility: HOSPITAL | Age: 70
End: 2020-01-01

## 2020-01-01 ENCOUNTER — HOSPITAL ENCOUNTER (OUTPATIENT)
Dept: CT IMAGING | Facility: HOSPITAL | Age: 70
Discharge: HOME | End: 2020-07-27
Attending: NEUROLOGICAL SURGERY | Admitting: NEUROLOGICAL SURGERY
Payer: COMMERCIAL

## 2020-01-01 ENCOUNTER — HOSPITAL ENCOUNTER (OUTPATIENT)
Dept: CT IMAGING | Facility: HOSPITAL | Age: 70
Discharge: HOME | End: 2020-09-03
Attending: FAMILY MEDICINE | Admitting: RADIOLOGY
Payer: COMMERCIAL

## 2020-01-01 ENCOUNTER — OFFICE VISIT (OUTPATIENT)
Dept: FAMILY MEDICINE | Facility: OTHER | Age: 70
End: 2020-01-01
Attending: PHYSICIAN ASSISTANT
Payer: COMMERCIAL

## 2020-01-01 ENCOUNTER — CARE COORDINATION (OUTPATIENT)
Dept: CARE COORDINATION | Facility: OTHER | Age: 70
End: 2020-01-01

## 2020-01-01 ENCOUNTER — VIRTUAL VISIT (OUTPATIENT)
Dept: NEUROSURGERY | Facility: CLINIC | Age: 70
End: 2020-01-01
Attending: FAMILY MEDICINE
Payer: COMMERCIAL

## 2020-01-01 ENCOUNTER — TELEPHONE (OUTPATIENT)
Dept: ONCOLOGY | Facility: CLINIC | Age: 70
End: 2020-01-01

## 2020-01-01 ENCOUNTER — DOCUMENTATION ONLY (OUTPATIENT)
Dept: FAMILY MEDICINE | Facility: OTHER | Age: 70
End: 2020-01-01

## 2020-01-01 ENCOUNTER — DOCUMENTATION ONLY (OUTPATIENT)
Dept: OTHER | Facility: CLINIC | Age: 70
End: 2020-01-01

## 2020-01-01 ENCOUNTER — APPOINTMENT (OUTPATIENT)
Dept: CT IMAGING | Facility: HOSPITAL | Age: 70
End: 2020-01-01
Attending: EMERGENCY MEDICINE
Payer: COMMERCIAL

## 2020-01-01 ENCOUNTER — HOSPITAL ENCOUNTER (OUTPATIENT)
Dept: CT IMAGING | Facility: HOSPITAL | Age: 70
Discharge: HOME | End: 2020-07-28
Attending: NEUROLOGICAL SURGERY | Admitting: NEUROLOGICAL SURGERY
Payer: COMMERCIAL

## 2020-01-01 ENCOUNTER — PRE VISIT (OUTPATIENT)
Dept: NEUROSURGERY | Facility: CLINIC | Age: 70
End: 2020-01-01

## 2020-01-01 ENCOUNTER — MEDICAL CORRESPONDENCE (OUTPATIENT)
Dept: HEALTH INFORMATION MANAGEMENT | Facility: CLINIC | Age: 70
End: 2020-01-01

## 2020-01-01 ENCOUNTER — HOSPITAL ENCOUNTER (EMERGENCY)
Facility: HOSPITAL | Age: 70
Discharge: HOME | End: 2020-07-13
Attending: EMERGENCY MEDICINE | Admitting: EMERGENCY MEDICINE
Payer: COMMERCIAL

## 2020-01-01 ENCOUNTER — DOCUMENTATION ONLY (OUTPATIENT)
Dept: CARE COORDINATION | Facility: CLINIC | Age: 70
End: 2020-01-01

## 2020-01-01 VITALS
HEART RATE: 92 BPM | OXYGEN SATURATION: 96 % | BODY MASS INDEX: 22.33 KG/M2 | WEIGHT: 134 LBS | DIASTOLIC BLOOD PRESSURE: 80 MMHG | SYSTOLIC BLOOD PRESSURE: 112 MMHG | TEMPERATURE: 97.4 F | HEIGHT: 65 IN

## 2020-01-01 VITALS
RESPIRATION RATE: 16 BRPM | HEART RATE: 76 BPM | OXYGEN SATURATION: 98 % | SYSTOLIC BLOOD PRESSURE: 117 MMHG | TEMPERATURE: 97.3 F | DIASTOLIC BLOOD PRESSURE: 90 MMHG

## 2020-01-01 VITALS
SYSTOLIC BLOOD PRESSURE: 104 MMHG | TEMPERATURE: 97.5 F | HEART RATE: 83 BPM | DIASTOLIC BLOOD PRESSURE: 60 MMHG | OXYGEN SATURATION: 95 %

## 2020-01-01 VITALS
WEIGHT: 114.64 LBS | BODY MASS INDEX: 19.1 KG/M2 | HEIGHT: 65 IN | OXYGEN SATURATION: 97 % | HEART RATE: 108 BPM | SYSTOLIC BLOOD PRESSURE: 108 MMHG | TEMPERATURE: 98.9 F | DIASTOLIC BLOOD PRESSURE: 78 MMHG

## 2020-01-01 VITALS
DIASTOLIC BLOOD PRESSURE: 67 MMHG | OXYGEN SATURATION: 95 % | TEMPERATURE: 97.5 F | HEART RATE: 76 BPM | SYSTOLIC BLOOD PRESSURE: 117 MMHG | RESPIRATION RATE: 16 BRPM

## 2020-01-01 DIAGNOSIS — R22.1 MASS OF RIGHT SIDE OF NECK: ICD-10-CM

## 2020-01-01 DIAGNOSIS — C83.38 DIFFUSE LARGE B-CELL LYMPHOMA OF LYMPH NODES OF MULTIPLE REGIONS (H): Primary | ICD-10-CM

## 2020-01-01 DIAGNOSIS — R35.0 URINARY FREQUENCY: ICD-10-CM

## 2020-01-01 DIAGNOSIS — R63.4 UNINTENDED WEIGHT LOSS: ICD-10-CM

## 2020-01-01 DIAGNOSIS — R53.1 WEAKNESS: ICD-10-CM

## 2020-01-01 DIAGNOSIS — H53.2 DOUBLE VISION: Primary | ICD-10-CM

## 2020-01-01 DIAGNOSIS — E11.9 TYPE 2 DIABETES MELLITUS WITHOUT COMPLICATION, WITHOUT LONG-TERM CURRENT USE OF INSULIN (H): ICD-10-CM

## 2020-01-01 DIAGNOSIS — R22.1 NECK MASS: ICD-10-CM

## 2020-01-01 DIAGNOSIS — R39.9 UTI SYMPTOMS: Primary | ICD-10-CM

## 2020-01-01 DIAGNOSIS — D49.6 BRAIN TUMOR (H): ICD-10-CM

## 2020-01-01 DIAGNOSIS — D18.09 GLOMUS TUMOR OF BASE OF SKULL: Primary | ICD-10-CM

## 2020-01-01 DIAGNOSIS — H53.2 DOUBLE VISION: ICD-10-CM

## 2020-01-01 DIAGNOSIS — F33.1 MODERATE EPISODE OF RECURRENT MAJOR DEPRESSIVE DISORDER (H): ICD-10-CM

## 2020-01-01 DIAGNOSIS — N39.0 URINARY TRACT INFECTION: Primary | ICD-10-CM

## 2020-01-01 DIAGNOSIS — H57.89 EYE DISCHARGE: Primary | ICD-10-CM

## 2020-01-01 DIAGNOSIS — H92.01 RIGHT EAR PAIN: ICD-10-CM

## 2020-01-01 DIAGNOSIS — C79.31 BRAIN METASTASIS: ICD-10-CM

## 2020-01-01 DIAGNOSIS — D18.09 GLOMUS TUMOR OF BASE OF SKULL: ICD-10-CM

## 2020-01-01 DIAGNOSIS — G93.89 BRAIN MASS: Primary | ICD-10-CM

## 2020-01-01 DIAGNOSIS — H91.21 SUDDEN RIGHT HEARING LOSS: ICD-10-CM

## 2020-01-01 DIAGNOSIS — C79.31 BRAIN METASTASIS: Primary | ICD-10-CM

## 2020-01-01 DIAGNOSIS — R22.1 NECK MASS: Primary | ICD-10-CM

## 2020-01-01 DIAGNOSIS — R63.4 WEIGHT LOSS: ICD-10-CM

## 2020-01-01 DIAGNOSIS — I10 ESSENTIAL HYPERTENSION: ICD-10-CM

## 2020-01-01 LAB
ALBUMIN SERPL-MCNC: 3.6 G/DL (ref 3.4–5)
ALBUMIN UR-MCNC: 10 MG/DL
ALBUMIN UR-MCNC: 10 MG/DL
ALBUMIN UR-MCNC: 100 MG/DL
ALP SERPL-CCNC: 70 U/L (ref 40–150)
ALT SERPL W P-5'-P-CCNC: 27 U/L (ref 0–50)
ANION GAP SERPL CALCULATED.3IONS-SCNC: 10 MMOL/L (ref 3–14)
ANION GAP SERPL CALCULATED.3IONS-SCNC: 8 MMOL/L (ref 3–14)
ANION GAP SERPL CALCULATED.3IONS-SCNC: 8 MMOL/L (ref 3–14)
APPEARANCE UR: ABNORMAL
APPEARANCE UR: ABNORMAL
APPEARANCE UR: CLEAR
APTT PPP: 29 SEC (ref 22–37)
AST SERPL W P-5'-P-CCNC: 67 U/L (ref 0–45)
BACTERIA #/AREA URNS HPF: ABNORMAL /HPF
BACTERIA SPEC CULT: ABNORMAL
BASOPHILS # BLD AUTO: 0 10E9/L (ref 0–0.2)
BASOPHILS # BLD AUTO: 0 10E9/L (ref 0–0.2)
BASOPHILS NFR BLD AUTO: 0.2 %
BASOPHILS NFR BLD AUTO: 0.3 %
BILIRUB SERPL-MCNC: 1.5 MG/DL (ref 0.2–1.3)
BILIRUB UR QL STRIP: NEGATIVE
BUN SERPL-MCNC: 11 MG/DL (ref 7–30)
BUN SERPL-MCNC: 13 MG/DL (ref 7–30)
BUN SERPL-MCNC: 9 MG/DL (ref 7–30)
CALCIUM SERPL-MCNC: 9.1 MG/DL (ref 8.5–10.1)
CALCIUM SERPL-MCNC: 9.2 MG/DL (ref 8.5–10.1)
CALCIUM SERPL-MCNC: 9.2 MG/DL (ref 8.5–10.1)
CAOX CRY #/AREA URNS HPF: ABNORMAL /HPF
CHLORIDE SERPL-SCNC: 104 MMOL/L (ref 94–109)
CHLORIDE SERPL-SCNC: 105 MMOL/L (ref 94–109)
CHLORIDE SERPL-SCNC: 107 MMOL/L (ref 94–109)
CO2 SERPL-SCNC: 24 MMOL/L (ref 20–32)
CO2 SERPL-SCNC: 24 MMOL/L (ref 20–32)
CO2 SERPL-SCNC: 28 MMOL/L (ref 20–32)
COLOR UR AUTO: ABNORMAL
COLOR UR AUTO: YELLOW
COLOR UR AUTO: YELLOW
COPATH REPORT: NORMAL
COPATH REPORT: NORMAL
CREAT SERPL-MCNC: 0.7 MG/DL (ref 0.52–1.04)
CREAT SERPL-MCNC: 0.72 MG/DL (ref 0.52–1.04)
CREAT SERPL-MCNC: 0.78 MG/DL (ref 0.52–1.04)
CREAT UR-MCNC: 218 MG/DL
DIFFERENTIAL METHOD BLD: ABNORMAL
DIFFERENTIAL METHOD BLD: ABNORMAL
EOSINOPHIL # BLD AUTO: 0 10E9/L (ref 0–0.7)
EOSINOPHIL # BLD AUTO: 0 10E9/L (ref 0–0.7)
EOSINOPHIL NFR BLD AUTO: 0.2 %
EOSINOPHIL NFR BLD AUTO: 0.3 %
ERYTHROCYTE [DISTWIDTH] IN BLOOD BY AUTOMATED COUNT: 13.7 % (ref 10–15)
ERYTHROCYTE [DISTWIDTH] IN BLOOD BY AUTOMATED COUNT: 13.8 % (ref 10–15)
ERYTHROCYTE [DISTWIDTH] IN BLOOD BY AUTOMATED COUNT: 13.8 % (ref 10–15)
EST. AVERAGE GLUCOSE BLD GHB EST-MCNC: 111 MG/DL
GFR SERPL CREATININE-BSD FRML MDRD: 77 ML/MIN/{1.73_M2}
GFR SERPL CREATININE-BSD FRML MDRD: 84 ML/MIN/{1.73_M2}
GFR SERPL CREATININE-BSD FRML MDRD: 86 ML/MIN/{1.73_M2}
GLUCOSE SERPL-MCNC: 145 MG/DL (ref 70–99)
GLUCOSE SERPL-MCNC: 188 MG/DL (ref 70–99)
GLUCOSE SERPL-MCNC: 41 MG/DL (ref 70–99)
GLUCOSE UR STRIP-MCNC: NEGATIVE MG/DL
HBA1C MFR BLD: 5.5 % (ref 0–5.6)
HCT VFR BLD AUTO: 43.8 % (ref 35–47)
HCT VFR BLD AUTO: 44.2 % (ref 35–47)
HCT VFR BLD AUTO: 44.3 % (ref 35–47)
HGB BLD-MCNC: 14.3 G/DL (ref 11.7–15.7)
HGB BLD-MCNC: 14.4 G/DL (ref 11.7–15.7)
HGB BLD-MCNC: 14.6 G/DL (ref 11.7–15.7)
HGB UR QL STRIP: ABNORMAL
HGB UR QL STRIP: NEGATIVE
HGB UR QL STRIP: NEGATIVE
HYALINE CASTS #/AREA URNS LPF: 4 /LPF
IMM GRANULOCYTES # BLD: 0 10E9/L (ref 0–0.4)
IMM GRANULOCYTES # BLD: 0.1 10E9/L (ref 0–0.4)
IMM GRANULOCYTES NFR BLD: 0.4 %
IMM GRANULOCYTES NFR BLD: 0.4 %
INR PPP: 1.14 (ref 0.86–1.14)
KETONES UR STRIP-MCNC: 10 MG/DL
KETONES UR STRIP-MCNC: 40 MG/DL
KETONES UR STRIP-MCNC: NEGATIVE MG/DL
LEUKOCYTE ESTERASE UR QL STRIP: ABNORMAL
LEUKOCYTE ESTERASE UR QL STRIP: ABNORMAL
LEUKOCYTE ESTERASE UR QL STRIP: NEGATIVE
LYMPHOCYTES # BLD AUTO: 0.8 10E9/L (ref 0.8–5.3)
LYMPHOCYTES # BLD AUTO: 2 10E9/L (ref 0.8–5.3)
LYMPHOCYTES NFR BLD AUTO: 10.9 %
LYMPHOCYTES NFR BLD AUTO: 17.1 %
MCH RBC QN AUTO: 28 PG (ref 26.5–33)
MCH RBC QN AUTO: 28.5 PG (ref 26.5–33)
MCH RBC QN AUTO: 28.5 PG (ref 26.5–33)
MCHC RBC AUTO-ENTMCNC: 32.3 G/DL (ref 31.5–36.5)
MCHC RBC AUTO-ENTMCNC: 32.6 G/DL (ref 31.5–36.5)
MCHC RBC AUTO-ENTMCNC: 33.3 G/DL (ref 31.5–36.5)
MCV RBC AUTO: 86 FL (ref 78–100)
MCV RBC AUTO: 86 FL (ref 78–100)
MCV RBC AUTO: 88 FL (ref 78–100)
MICROALBUMIN UR-MCNC: 62 MG/L
MICROALBUMIN/CREAT UR: 28.53 MG/G CR (ref 0–25)
MONOCYTES # BLD AUTO: 0.5 10E9/L (ref 0–1.3)
MONOCYTES # BLD AUTO: 0.7 10E9/L (ref 0–1.3)
MONOCYTES NFR BLD AUTO: 6.5 %
MONOCYTES NFR BLD AUTO: 6.6 %
MUCOUS THREADS #/AREA URNS LPF: PRESENT /LPF
NEUTROPHILS # BLD AUTO: 6.1 10E9/L (ref 1.6–8.3)
NEUTROPHILS # BLD AUTO: 8.6 10E9/L (ref 1.6–8.3)
NEUTROPHILS NFR BLD AUTO: 75.6 %
NEUTROPHILS NFR BLD AUTO: 81.5 %
NITRATE UR QL: NEGATIVE
NITRATE UR QL: NEGATIVE
NITRATE UR QL: POSITIVE
NRBC # BLD AUTO: 0 10*3/UL
NRBC # BLD AUTO: 0 10*3/UL
NRBC BLD AUTO-RTO: 0 /100
NRBC BLD AUTO-RTO: 0 /100
PH UR STRIP: 6 PH (ref 4.7–8)
PLATELET # BLD AUTO: 148 10E9/L (ref 150–450)
PLATELET # BLD AUTO: 208 10E9/L (ref 150–450)
PLATELET # BLD AUTO: 300 10E9/L (ref 150–450)
POTASSIUM SERPL-SCNC: 3.3 MMOL/L (ref 3.4–5.3)
POTASSIUM SERPL-SCNC: 3.4 MMOL/L (ref 3.4–5.3)
POTASSIUM SERPL-SCNC: 3.6 MMOL/L (ref 3.4–5.3)
PROT SERPL-MCNC: 7.8 G/DL (ref 6.8–8.8)
RBC # BLD AUTO: 5.02 10E12/L (ref 3.8–5.2)
RBC # BLD AUTO: 5.12 10E12/L (ref 3.8–5.2)
RBC # BLD AUTO: 5.14 10E12/L (ref 3.8–5.2)
RBC #/AREA URNS AUTO: 0 /HPF (ref 0–2)
RBC #/AREA URNS AUTO: 0 /HPF (ref 0–2)
RBC #/AREA URNS AUTO: >182 /HPF (ref 0–2)
SARS-COV-2 RNA SPEC QL NAA+PROBE: NOT DETECTED
SODIUM SERPL-SCNC: 138 MMOL/L (ref 133–144)
SODIUM SERPL-SCNC: 139 MMOL/L (ref 133–144)
SODIUM SERPL-SCNC: 141 MMOL/L (ref 133–144)
SOURCE: ABNORMAL
SP GR UR STRIP: 1.02 (ref 1–1.03)
SP GR UR STRIP: 1.02 (ref 1–1.03)
SP GR UR STRIP: >1.05 (ref 1–1.03)
SPECIMEN SOURCE: ABNORMAL
SPECIMEN SOURCE: NORMAL
SQUAMOUS #/AREA URNS AUTO: 2 /HPF (ref 0–1)
SQUAMOUS #/AREA URNS AUTO: 4 /HPF (ref 0–1)
TROPONIN I SERPL-MCNC: <0.015 UG/L (ref 0–0.04)
UROBILINOGEN UR STRIP-MCNC: 2 MG/DL (ref 0–2)
UROBILINOGEN UR STRIP-MCNC: NORMAL MG/DL (ref 0–2)
UROBILINOGEN UR STRIP-MCNC: NORMAL MG/DL (ref 0–2)
WBC # BLD AUTO: 11.4 10E9/L (ref 4–11)
WBC # BLD AUTO: 7.5 10E9/L (ref 4–11)
WBC # BLD AUTO: 9.1 10E9/L (ref 4–11)
WBC #/AREA URNS AUTO: 0 /HPF (ref 0–5)
WBC #/AREA URNS AUTO: 5 /HPF (ref 0–5)
WBC #/AREA URNS AUTO: >182 /HPF (ref 0–5)
WBC CLUMPS #/AREA URNS HPF: PRESENT /HPF

## 2020-01-01 PROCEDURE — 80048 BASIC METABOLIC PNL TOTAL CA: CPT | Performed by: EMERGENCY MEDICINE

## 2020-01-01 PROCEDURE — G0463 HOSPITAL OUTPT CLINIC VISIT: HCPCS

## 2020-01-01 PROCEDURE — 85027 COMPLETE CBC AUTOMATED: CPT | Performed by: RADIOLOGY

## 2020-01-01 PROCEDURE — 80053 COMPREHEN METABOLIC PANEL: CPT | Mod: ZL | Performed by: PHYSICIAN ASSISTANT

## 2020-01-01 PROCEDURE — 36415 COLL VENOUS BLD VENIPUNCTURE: CPT | Mod: ZL | Performed by: PHYSICIAN ASSISTANT

## 2020-01-01 PROCEDURE — 25500064 ZZH RX 255 OP 636: Performed by: RADIOLOGY

## 2020-01-01 PROCEDURE — 25800030 ZZH RX IP 258 OP 636: Performed by: EMERGENCY MEDICINE

## 2020-01-01 PROCEDURE — 81001 URINALYSIS AUTO W/SCOPE: CPT | Mod: ZL | Performed by: FAMILY MEDICINE

## 2020-01-01 PROCEDURE — 70491 CT SOFT TISSUE NECK W/DYE: CPT | Mod: TC

## 2020-01-01 PROCEDURE — 70553 MRI BRAIN STEM W/O & W/DYE: CPT | Mod: TC

## 2020-01-01 PROCEDURE — 80048 BASIC METABOLIC PNL TOTAL CA: CPT | Performed by: RADIOLOGY

## 2020-01-01 PROCEDURE — U0003 INFECTIOUS AGENT DETECTION BY NUCLEIC ACID (DNA OR RNA); SEVERE ACUTE RESPIRATORY SYNDROME CORONAVIRUS 2 (SARS-COV-2) (CORONAVIRUS DISEASE [COVID-19]), AMPLIFIED PROBE TECHNIQUE, MAKING USE OF HIGH THROUGHPUT TECHNOLOGIES AS DESCRIBED BY CMS-2020-01-R: HCPCS | Mod: ZL | Performed by: NEUROLOGICAL SURGERY

## 2020-01-01 PROCEDURE — 36415 COLL VENOUS BLD VENIPUNCTURE: CPT | Performed by: EMERGENCY MEDICINE

## 2020-01-01 PROCEDURE — 85025 COMPLETE CBC W/AUTO DIFF WBC: CPT | Performed by: EMERGENCY MEDICINE

## 2020-01-01 PROCEDURE — 81001 URINALYSIS AUTO W/SCOPE: CPT | Performed by: EMERGENCY MEDICINE

## 2020-01-01 PROCEDURE — 87086 URINE CULTURE/COLONY COUNT: CPT | Mod: ZL | Performed by: PHYSICIAN ASSISTANT

## 2020-01-01 PROCEDURE — 74177 CT ABD & PELVIS W/CONTRAST: CPT | Mod: TC

## 2020-01-01 PROCEDURE — A9585 GADOBUTROL INJECTION: HCPCS | Performed by: RADIOLOGY

## 2020-01-01 PROCEDURE — 88173 CYTOPATH EVAL FNA REPORT: CPT | Mod: TC | Performed by: RADIOLOGY

## 2020-01-01 PROCEDURE — 99285 EMERGENCY DEPT VISIT HI MDM: CPT | Mod: Z6 | Performed by: EMERGENCY MEDICINE

## 2020-01-01 PROCEDURE — 99207 ZZC NO CHARGE NURSE ONLY: CPT

## 2020-01-01 PROCEDURE — 36415 COLL VENOUS BLD VENIPUNCTURE: CPT | Performed by: RADIOLOGY

## 2020-01-01 PROCEDURE — 83036 HEMOGLOBIN GLYCOSYLATED A1C: CPT | Mod: ZL | Performed by: PHYSICIAN ASSISTANT

## 2020-01-01 PROCEDURE — 85025 COMPLETE CBC W/AUTO DIFF WBC: CPT | Mod: ZL | Performed by: PHYSICIAN ASSISTANT

## 2020-01-01 PROCEDURE — 93010 ELECTROCARDIOGRAM REPORT: CPT | Performed by: INTERNAL MEDICINE

## 2020-01-01 PROCEDURE — 88341 IMHCHEM/IMCYTCHM EA ADD ANTB: CPT | Performed by: RADIOLOGY

## 2020-01-01 PROCEDURE — 99207 ZZC NO BILLABLE SERVICE THIS VISIT: CPT | Performed by: FAMILY MEDICINE

## 2020-01-01 PROCEDURE — 40000788 ZZHCL STATISTIC ESTIMATED AVERAGE GLUCOSE: Mod: ZL | Performed by: PHYSICIAN ASSISTANT

## 2020-01-01 PROCEDURE — 71045 X-RAY EXAM CHEST 1 VIEW: CPT | Mod: TC

## 2020-01-01 PROCEDURE — 25000125 ZZHC RX 250

## 2020-01-01 PROCEDURE — 81001 URINALYSIS AUTO W/SCOPE: CPT | Mod: ZL | Performed by: PHYSICIAN ASSISTANT

## 2020-01-01 PROCEDURE — 00000155 ZZHCL STATISTIC H-CELL BLOCK W/STAIN: Performed by: RADIOLOGY

## 2020-01-01 PROCEDURE — 70496 CT ANGIOGRAPHY HEAD: CPT | Mod: TC

## 2020-01-01 PROCEDURE — 99214 OFFICE O/P EST MOD 30 MIN: CPT | Performed by: FAMILY MEDICINE

## 2020-01-01 PROCEDURE — 99285 EMERGENCY DEPT VISIT HI MDM: CPT | Mod: 25

## 2020-01-01 PROCEDURE — 99203 OFFICE O/P NEW LOW 30 MIN: CPT | Mod: CR | Performed by: INTERNAL MEDICINE

## 2020-01-01 PROCEDURE — 85730 THROMBOPLASTIN TIME PARTIAL: CPT | Performed by: RADIOLOGY

## 2020-01-01 PROCEDURE — 71260 CT THORAX DX C+: CPT | Mod: TC

## 2020-01-01 PROCEDURE — 99214 OFFICE O/P EST MOD 30 MIN: CPT | Performed by: PHYSICIAN ASSISTANT

## 2020-01-01 PROCEDURE — 87186 SC STD MICRODIL/AGAR DIL: CPT | Mod: ZL | Performed by: PHYSICIAN ASSISTANT

## 2020-01-01 PROCEDURE — 77012 CT SCAN FOR NEEDLE BIOPSY: CPT | Mod: TC

## 2020-01-01 PROCEDURE — 85610 PROTHROMBIN TIME: CPT | Performed by: RADIOLOGY

## 2020-01-01 PROCEDURE — 87088 URINE BACTERIA CULTURE: CPT | Mod: ZL | Performed by: PHYSICIAN ASSISTANT

## 2020-01-01 PROCEDURE — 82043 UR ALBUMIN QUANTITATIVE: CPT | Mod: ZL | Performed by: FAMILY MEDICINE

## 2020-01-01 PROCEDURE — 88342 IMHCHEM/IMCYTCHM 1ST ANTB: CPT | Mod: TC | Performed by: RADIOLOGY

## 2020-01-01 PROCEDURE — 84484 ASSAY OF TROPONIN QUANT: CPT | Performed by: EMERGENCY MEDICINE

## 2020-01-01 PROCEDURE — 25000128 H RX IP 250 OP 636: Performed by: RADIOLOGY

## 2020-01-01 PROCEDURE — 88305 TISSUE EXAM BY PATHOLOGIST: CPT | Mod: TC | Performed by: RADIOLOGY

## 2020-01-01 PROCEDURE — 93005 ELECTROCARDIOGRAM TRACING: CPT

## 2020-01-01 RX ORDER — SODIUM CHLORIDE 9 MG/ML
INJECTION, SOLUTION INTRAVENOUS CONTINUOUS
Status: DISCONTINUED | OUTPATIENT
Start: 2020-01-01 | End: 2020-01-01 | Stop reason: HOSPADM

## 2020-01-01 RX ORDER — IOPAMIDOL 755 MG/ML
50 INJECTION, SOLUTION INTRAVASCULAR ONCE
Status: COMPLETED | OUTPATIENT
Start: 2020-01-01 | End: 2020-01-01

## 2020-01-01 RX ORDER — OXYCODONE AND ACETAMINOPHEN 5; 325 MG/1; MG/1
1 TABLET ORAL EVERY 6 HOURS PRN
Qty: 12 TABLET | Refills: 0 | Status: SHIPPED | OUTPATIENT
Start: 2020-01-01 | End: 2020-01-01

## 2020-01-01 RX ORDER — LIDOCAINE HYDROCHLORIDE 10 MG/ML
10 INJECTION, SOLUTION EPIDURAL; INFILTRATION; INTRACAUDAL; PERINEURAL ONCE
Status: CANCELLED | OUTPATIENT
Start: 2020-01-01

## 2020-01-01 RX ORDER — SERTRALINE HYDROCHLORIDE 100 MG/1
100 TABLET, FILM COATED ORAL DAILY
Qty: 90 TABLET | Refills: 0 | Status: SHIPPED | OUTPATIENT
Start: 2020-01-01

## 2020-01-01 RX ORDER — GADOBUTROL 604.72 MG/ML
2 INJECTION INTRAVENOUS ONCE
Status: COMPLETED | OUTPATIENT
Start: 2020-01-01 | End: 2020-01-01

## 2020-01-01 RX ORDER — IOPAMIDOL 612 MG/ML
100 INJECTION, SOLUTION INTRAVASCULAR ONCE
Status: COMPLETED | OUTPATIENT
Start: 2020-01-01 | End: 2020-01-01

## 2020-01-01 RX ORDER — NALOXONE HYDROCHLORIDE 0.4 MG/ML
.1-.4 INJECTION, SOLUTION INTRAMUSCULAR; INTRAVENOUS; SUBCUTANEOUS
Status: CANCELLED | OUTPATIENT
Start: 2020-01-01

## 2020-01-01 RX ORDER — NALOXONE HYDROCHLORIDE 0.4 MG/ML
.1-.4 INJECTION, SOLUTION INTRAMUSCULAR; INTRAVENOUS; SUBCUTANEOUS
Status: DISCONTINUED | OUTPATIENT
Start: 2020-01-01 | End: 2020-01-01 | Stop reason: HOSPADM

## 2020-01-01 RX ORDER — LIDOCAINE HYDROCHLORIDE 10 MG/ML
INJECTION, SOLUTION EPIDURAL; INFILTRATION; INTRACAUDAL; PERINEURAL
Status: COMPLETED
Start: 2020-01-01 | End: 2020-01-01

## 2020-01-01 RX ORDER — CYCLOSPORINE 0.5 MG/ML
1 EMULSION OPHTHALMIC 2 TIMES DAILY
Qty: 5.5 ML | Refills: 1 | Status: SHIPPED | OUTPATIENT
Start: 2020-01-01

## 2020-01-01 RX ORDER — LIDOCAINE HYDROCHLORIDE 10 MG/ML
10 INJECTION, SOLUTION EPIDURAL; INFILTRATION; INTRACAUDAL; PERINEURAL ONCE
Status: DISCONTINUED | OUTPATIENT
Start: 2020-01-01 | End: 2020-01-01 | Stop reason: HOSPADM

## 2020-01-01 RX ORDER — GLIPIZIDE 10 MG/1
10 TABLET, FILM COATED, EXTENDED RELEASE ORAL DAILY
Qty: 90 TABLET | Refills: 0 | Status: SHIPPED | OUTPATIENT
Start: 2020-01-01

## 2020-01-01 RX ORDER — GENTAMICIN SULFATE 3 MG/ML
1 SOLUTION/ DROPS OPHTHALMIC 3 TIMES DAILY
Qty: 5 ML | Refills: 0 | Status: SHIPPED | OUTPATIENT
Start: 2020-01-01 | End: 2020-01-01

## 2020-01-01 RX ORDER — CIPROFLOXACIN 250 MG/1
250 TABLET, FILM COATED ORAL 2 TIMES DAILY
Qty: 20 TABLET | Refills: 0 | Status: SHIPPED | OUTPATIENT
Start: 2020-01-01 | End: 2020-01-01

## 2020-01-01 RX ORDER — HYDROCHLOROTHIAZIDE 25 MG/1
25 TABLET ORAL DAILY PRN
Qty: 90 TABLET | Refills: 3 | Status: SHIPPED | OUTPATIENT
Start: 2020-01-01

## 2020-01-01 RX ORDER — OXYCODONE HCL 5 MG/5 ML
5-10 SOLUTION, ORAL ORAL EVERY 6 HOURS PRN
Qty: 500 ML | Refills: 0 | Status: SHIPPED | OUTPATIENT
Start: 2020-01-01 | End: 2020-01-01

## 2020-01-01 RX ORDER — OXYCODONE AND ACETAMINOPHEN 5; 325 MG/1; MG/1
1 TABLET ORAL EVERY 6 HOURS PRN
Qty: 30 TABLET | Refills: 0 | Status: SHIPPED | OUTPATIENT
Start: 2020-01-01

## 2020-01-01 RX ORDER — IOPAMIDOL 612 MG/ML
50 INJECTION, SOLUTION INTRAVASCULAR ONCE
Status: COMPLETED | OUTPATIENT
Start: 2020-01-01 | End: 2020-01-01

## 2020-01-01 RX ADMIN — GADOBUTROL 2 ML: 604.72 INJECTION INTRAVENOUS at 11:13

## 2020-01-01 RX ADMIN — IOPAMIDOL 100 ML: 612 INJECTION, SOLUTION INTRAVENOUS at 14:17

## 2020-01-01 RX ADMIN — SODIUM CHLORIDE: 9 INJECTION, SOLUTION INTRAVENOUS at 12:15

## 2020-01-01 RX ADMIN — IOPAMIDOL 50 ML: 612 INJECTION, SOLUTION INTRAVENOUS at 14:17

## 2020-01-01 RX ADMIN — SODIUM CHLORIDE 500 ML: 9 INJECTION, SOLUTION INTRAVENOUS at 10:21

## 2020-01-01 RX ADMIN — IOPAMIDOL 50 ML: 755 INJECTION, SOLUTION INTRAVENOUS at 16:17

## 2020-01-01 RX ADMIN — IOPAMIDOL 100 ML: 612 INJECTION, SOLUTION INTRAVENOUS at 15:40

## 2020-01-01 RX ADMIN — LIDOCAINE HYDROCHLORIDE 8 ML: 10 INJECTION, SOLUTION EPIDURAL; INFILTRATION; INTRACAUDAL; PERINEURAL at 09:48

## 2020-01-01 ASSESSMENT — ENCOUNTER SYMPTOMS
WEAKNESS: 1
GASTROINTESTINAL NEGATIVE: 1
CARDIOVASCULAR NEGATIVE: 1
ALLERGIC/IMMUNOLOGIC NEGATIVE: 1
ENDOCRINE NEGATIVE: 1
SLEEP DISTURBANCE: 0
MUSCULOSKELETAL NEGATIVE: 1
EYES NEGATIVE: 1
MYALGIAS: 0
HEMATOLOGIC/LYMPHATIC NEGATIVE: 1
RESPIRATORY NEGATIVE: 1
NECK PAIN: 0
CONSTITUTIONAL NEGATIVE: 1
PSYCHIATRIC NEGATIVE: 1
NERVOUS/ANXIOUS: 0
NECK STIFFNESS: 0
FEVER: 0

## 2020-01-01 ASSESSMENT — ANXIETY QUESTIONNAIRES
4. TROUBLE RELAXING: NOT AT ALL
7. FEELING AFRAID AS IF SOMETHING AWFUL MIGHT HAPPEN: NOT AT ALL
2. NOT BEING ABLE TO STOP OR CONTROL WORRYING: NOT AT ALL
1. FEELING NERVOUS, ANXIOUS, OR ON EDGE: NOT AT ALL
3. WORRYING TOO MUCH ABOUT DIFFERENT THINGS: NOT AT ALL
GAD7 TOTAL SCORE: 0
6. BECOMING EASILY ANNOYED OR IRRITABLE: NOT AT ALL
5. BEING SO RESTLESS THAT IT IS HARD TO SIT STILL: NOT AT ALL
GAD7 TOTAL SCORE: 0

## 2020-01-01 ASSESSMENT — PAIN SCALES - GENERAL
PAINLEVEL: NO PAIN (0)
PAINLEVEL: WORST PAIN (10)

## 2020-01-01 ASSESSMENT — MIFFLIN-ST. JEOR
SCORE: 1030.88
SCORE: 1118.7

## 2020-01-01 ASSESSMENT — PATIENT HEALTH QUESTIONNAIRE - PHQ9
SUM OF ALL RESPONSES TO PHQ QUESTIONS 1-9: 6
SUM OF ALL RESPONSES TO PHQ QUESTIONS 1-9: 8

## 2020-01-15 NOTE — TELEPHONE ENCOUNTER
1:42 PM    Reason for Call: Phone Call    Description: Patient states she has had a UTI for months and it is not going away. She states now she has been vomiting. Please call patient as she is very concerned on what to do. It looks like a referral was sent to urology in December but patient states she hasn't been to see urologist and was never scheduled.    Was an appointment offered for this call? No  If yes : Appointment type              Date    Preferred method for responding to this message: Telephone Call  What is your phone number ?    If we cannot reach you directly, may we leave a detailed response at the number you provided? Yes, [phone]    Can this message wait until your PCP/provider returns, if available today?  Not applicable, PCP is in    Mara Gracia LPN

## 2020-01-17 NOTE — TELEPHONE ENCOUNTER
To: Nurse to Dr. Ch is concerned with patients UTI that he claims has not really cleared up for the past 3 months.  Please call him back at [phone] to discuss.  Thank you

## 2020-01-17 NOTE — TELEPHONE ENCOUNTER
"Patient calling and would like to go to lab for a UTI.   Patient stated she has had these symptoms for a couple of months and has been in the clinic and ER, but the UTI is not going away. Stated she is \"not getting the medication long enough to get rid of the UTI\". Pateint reports having urgency, pain, leakage, odor, cloudy urine, and recently she has had \"bouts of fevers and vomiting and I have weakness where I am unable to stand during the day and I have no energy\". Patient had a urology appointment scheduled on 01/08, but had to cancel due to \"being very sick and vomiting\". Labs pended for approval or denial. Please advise, thank you.    Patient's number is [phone]  "

## 2020-01-17 NOTE — TELEPHONE ENCOUNTER
Meet states that he visited with her today and the homemaker states that both days pt has been sitting in a full urine pad and that she states she is dealing with a raging UTI.  Pt told homemaker that she would like to have an appointment with us to be seen as soon as possible.  Made an appointment for Monday per pts request.

## 2020-01-20 NOTE — TELEPHONE ENCOUNTER
She needs to go to the ER if she is that sick, it might be something more than a UTI and she needs evaluation.  I would not recommend coming here as I cannot get the testing needed completed in a timely manner.  If she cannot get a ride, she needs to call an ambulance.

## 2020-01-20 NOTE — TELEPHONE ENCOUNTER
Pt was instructed to go the ER per Dr. Mendez.  All recommendations given per physician.  Pt appointment canceled for today.

## 2020-01-20 NOTE — TELEPHONE ENCOUNTER
Patient calling and requesting an antibiotic. States she is too sick to come in for her appointment today. Patient stated she will reschedule with urology once she gets something to help her leave the house. Stated there is days she can't get out of bed due to being so sick that she is vomiting. Patient uses [facility] in [place] for a pharmacy.     Patient's phone number is [phone]

## 2020-04-20 NOTE — LETTER
April 20, 2020      Nilda TENISHA Ramirez  [address] [place] [address]            Dear Nilda,       Due to current COVID-19 concerns, we are temporarily refilling essential medications without an office visit and/or laboratory testing.  Please understand that certain medications require monitoring, so please schedule an appointment and/or laboratory work when the current restrictions are lifted.  Virtual Visits are available by telephone, video, or MyChart should you have any concerns (uncontrolled symptoms, medication side effects, etc).               Sincerely,        Jennifer Bowling MD

## 2020-04-20 NOTE — TELEPHONE ENCOUNTER
Glipizide      Last Written Prescription Date: 7.9.19   Last Fill Quantity: 90,   # refills: 3  Last Office Visit: 7.9.19  Future Office visit:       Routing refill request to provider for review/approval because:  Lab Results   Component Value Date    A1C 7.1 07/09/2019    A1C 6.6 10/11/2018    A1C 5.5 08/03/2017    A1C 6.3 05/04/2017    A1C 5.7 10/09/2015            Sertraline  Last Written Prescription Date:  7.9.19  Last Fill Quantity: 90,   # refills: 3  Last Office Visit:   Future Office visit:       Routing refill request to provider for review/approval because:  PHQ-9 score:    PHQ 7/9/2019   PHQ-9 Total Score 10   Q9: Thoughts of better off dead/self-harm past 2 weeks Not at all       Covid letter sent.    Frida Joseph RN

## 2020-06-18 NOTE — PROGRESS NOTES
"Nilda Ramirez is a 70 year old female who is being evaluated via a billable telephone visit.      The patient has been notified of following:     \"This telephone visit will be conducted via a call between you and your physician/provider. We have found that certain health care needs can be provided without the need for a physical exam.  This service lets us provide the care you need with a short phone conversation.  If a prescription is necessary we can send it directly to your pharmacy.  If lab work is needed we can place an order for that and you can then stop by our lab to have the test done at a later time.    Telephone visits are billed at different rates depending on your insurance coverage. During this emergency period, for some insurers they may be billed the same as an in-person visit.  Please reach out to your insurance provider with any questions.    If during the course of the call the physician/provider feels a telephone visit is not appropriate, you will not be charged for this service.\"    Patient has given verbal consent for Telephone visit?  Yes    What phone number would you like to be contacted at? [phone]    How would you like to obtain your AVS? Mail a copy    Subjective     Nilda Ramirez is a 70 year old female who presents via phone visit today for the following health issues:    HPI   SYMPTOMS OF ILLNESS      Duration: 10 days    Description  ear pain right and headache    Severity: moderate    Accompanying signs and symptoms: double vision developed in the past few days    History (predisposing factors):  none    Precipitating or alleviating factors: None    Therapies tried and outcome:  none       URINARY TRACT SYMPTOMS      Duration: Since December    Description  frequency, odor and hesitancy    Intensity:  mild    Accompanying signs and symptoms:  Fever/chills: no   Flank pain no   Nausea and vomiting: no   Vaginal symptoms: none  Abdominal/Pelvic Pain: no     History  History " of frequent UTI's: no   History of kidney stones: no   Sexually Active: no   Possibility of pregnancy: No    Precipitating or alleviating factors: None    Therapies tried and outcome: none   Outcome: Pt did have a UTI in October and December and was treated twice, still having burning with urination      Weight loss      Duration: Several months    Description (location/character/radiation): Lost 40 pounds since the second UTI, had nausea and vomiting for 2 days and was feeling so sick, but appetite has not returned    Intensity:  moderate    Accompanying signs and symptoms: significant weight lost    History (similar episodes/previous evaluation): None    Precipitating or alleviating factors: None    Therapies tried and outcome: None          Patient Active Problem List   Diagnosis     Advanced care planning/counseling discussion     Type 2 diabetes mellitus without complication, without long-term current use of insulin (H)     Benign neoplasm of cranial nerve (H)     Schwannoma of cranial nerve (H)     Closed fracture of cervical vertebra (H)     Anemia     Anxiety state     Arthropathy     History of pulmonary embolism     Major depressive disorder, recurrent episode (H)     Esophageal reflux     Essential hypertension     History of disease of skin and subcutaneous tissue     Insomnia     Astigmatism     Dry eyes     Exposure keratopathy     Hypermetropia     Presbyopia     Past Surgical History:   Procedure Laterality Date     ARTHROSCOPY KNEE  1996    bilat total knees     BACK SURGERY  2006     BIOPSY BREAST  2009     BIOPSY BREAST  2010     BRAIN TUMOR RESECTION  2000    Acoustic neuroma/schwanoma     COLONOSCOPY  2007    repeat 10 yrs     D&C  1990     deafness in left ear       knee replacement  2/2013    right     LAPAROSCOPIC CHOLECYSTECTOMY WITH CHOLANGIOGRAMS N/A 8/7/2017    Procedure: LAPAROSCOPIC CHOLECYSTECTOMY WITH CHOLANGIOGRAMS;  LAPAROSCOPIC CHOLECYSTECTOMY WITH  CHOLANGIOGRAMS;  Surgeon:  Huma Pierre MD;  Location: [facility]     Left total knee replacement  11/19/2012    left - Degenerative disc disease     Multi-level cervical spine fusion      Cervical spine multi-level fracture     Right knee cortisone injection  11/26/2012     total knee arthroplasty  2/15/2013    right - knee arthrosis, severe valgus       Social History     Tobacco Use     Smoking status: Never Smoker     Smokeless tobacco: Never Used     Tobacco comment: no passive smoke exposure   Substance Use Topics     Alcohol use: Yes     Comment: beer & wine rarely     Family History   Problem Relation Age of Onset     Colorectal Cancer Mother         mother passed in here 50's     Thyroid Disease No family hx of      Asthma No family hx of          Current Outpatient Medications   Medication Sig Dispense Refill     acetaminophen (TYLENOL) 325 MG tablet Take 1-2 tablets by mouth every 4 hours as needed.       ASPIRIN EC PO Take 162 mg by mouth daily       ANDERSON CONTOUR NEXT test strip USE TO TEST BLOOD SUGAR ONCE DAILY OR AS DIRECTED. 50 strip 6     CONTOUR NEXT TEST test strip TEST ONCE DAILY 50 strip 3     cyclobenzaprine (FLEXERIL) 5 MG tablet Take 1 tablet (5 mg) by mouth 3 times daily as needed for muscle spasms 30 tablet 0     cycloSPORINE (RESTASIS) 0.05 % ophthalmic emulsion Place 1 drop into both eyes 2 times daily       EASY TOUCH LANCETS 32G/TWIST MISC AS DIRECTED 100 each 2     glipiZIDE (GLUCOTROL XL) 10 MG 24 hr tablet TAKE 1 TABLET (10 MG) BY MOUTH DAILY 90 tablet 0     hydrochlorothiazide (HYDRODIURIL) 25 MG tablet Take 1 tablet (25 mg) by mouth daily as needed (edema) 90 tablet 3     omeprazole (PRILOSEC) 20 MG DR capsule Take 1 capsule (20 mg) by mouth daily as needed (reflux) 90 capsule 3     order for DME Incontinence pads, active style, up to 3 daily 100 Units prn     sertraline (ZOLOFT) 100 MG tablet TAKE 1 TABLET (100 MG) BY MOUTH DAILY 90 tablet 0     traZODone (DESYREL) 100 MG tablet Take 1 tablet (100 mg) by  mouth At Bedtime 90 tablet 3     CLEAR-ATADINE 10 MG tablet TAKE 1 TABLET (10 MG) BY MOUTH DAILY AS NEEDED (Patient not taking: Reported on 6/18/2020) 90 tablet 3     Allergies   Allergen Reactions     Hydrocodone Bitartrate      Meperidine Hcl      Morphine Sulfate        Reviewed and updated as needed this visit by Provider  Tobacco  Allergies  Meds  Problems  Med Hx  Surg Hx  Fam Hx         Review of Systems   Constitutional, HEENT, cardiovascular, pulmonary, gi and gu systems are negative, except as otherwise noted.       Objective   Reported vitals:  There were no vitals taken for this visit.   PSYCH: Alert and oriented times 3; coherent speech, normal   rate and volume, able to articulate logical thoughts, able   to abstract reason, no tangential thoughts, no hallucinations   or delusions  Her affect is normal  RESP: No cough, no audible wheezing, able to talk in full sentences  Remainder of exam unable to be completed due to telephone visits    Diagnostic Test Results:  none         Assessment/Plan:  1. Double vision  With constellation of symptoms, patient's needs cannot be met with telephone visit.  She does need a physical exam and labs with likely imaging (patient does have a history of acoustic neuroma and schwannoma of cranial nerve).  Patient is scheduled with Padma Vyas in [place] tomorrow afternoon, as I am out in the afternoon.    2. Right ear pain    3. Weight loss    4. Urinary frequency      Return in about 1 day (around 6/19/2020) for clinic evaluation.      Phone call duration:  5 minutes (but no charge as patient is scheduled for in person appointment)    Jennifer Bowling MD

## 2020-06-18 NOTE — Clinical Note
Please schedule patient in [place] tomorrow afternoon or Monday afternoon (positive screen with nausea and symptoms of illness).  If you let me know who she is scheduled with, I can talk to them or forward my note.  Thanks!

## 2020-06-18 NOTE — PROGRESS NOTES
Subjective     Nilda Ramirez is a 70 year old female who presents to clinic today for the following health issues:    HPI   Eye(s) Problem      Duration: about 1 month    Description:  Location: right and has double vision  Pain: YES  Redness: no   Discharge: no     Accompanying signs and symptoms: Double vision    History (Trauma, foreign body exposure,): None: had a brain tumor on left side    Precipitating or alleviating factors (contact use): None    Therapies tried and outcome: None    URINARY TRACT SYMPTOMS      Duration: since October of 2019    Description  Cloudy and buring when she urinates    Intensity:  mild    Accompanying signs and symptoms:  Fever/chills: no   Flank pain no   Nausea and vomiting: YES  Vaginal symptoms: none  Abdominal/Pelvic Pain: no     History  History of frequent UTI's: no   History of kidney stones: no   Sexually Active: no   Possibility of pregnancy: No    Precipitating or alleviating factors: None    Therapies tried and outcome: course of antibiotics - in December 2019   Outcome: did not work    Ear pain      Duration: about 1 week    Description (location/character/radiation): R ear    Intensity:  mild    Accompanying signs and symptoms: none    History (similar episodes/previous evaluation): None    Precipitating or alleviating factors: None    Therapies tried and outcome: None            Patient Active Problem List   Diagnosis     Advanced care planning/counseling discussion     Type 2 diabetes mellitus without complication, without long-term current use of insulin (H)     Benign neoplasm of cranial nerve (H)     Schwannoma of cranial nerve (H)     Closed fracture of cervical vertebra (H)     Anemia     Anxiety state     Arthropathy     History of pulmonary embolism     Major depressive disorder, recurrent episode (H)     Esophageal reflux     Essential hypertension     History of disease of skin and subcutaneous tissue     Insomnia     Astigmatism     Dry eyes      Exposure keratopathy     Hypermetropia     Presbyopia     Past Surgical History:   Procedure Laterality Date     ARTHROSCOPY KNEE  1996    bilat total knees     BACK SURGERY  2006     BIOPSY BREAST  2009     BIOPSY BREAST  2010     BRAIN TUMOR RESECTION  2000    Acoustic neuroma/schwanoma     COLONOSCOPY  2007    repeat 10 yrs     D&C  1990     deafness in left ear       knee replacement  2/2013    right     LAPAROSCOPIC CHOLECYSTECTOMY WITH CHOLANGIOGRAMS N/A 8/7/2017    Procedure: LAPAROSCOPIC CHOLECYSTECTOMY WITH CHOLANGIOGRAMS;  LAPAROSCOPIC CHOLECYSTECTOMY WITH  CHOLANGIOGRAMS;  Surgeon: Huma Pierre MD;  Location: [facility]     Left total knee replacement  11/19/2012    left - Degenerative disc disease     Multi-level cervical spine fusion      Cervical spine multi-level fracture     Right knee cortisone injection  11/26/2012     total knee arthroplasty  2/15/2013    right - knee arthrosis, severe valgus       Social History     Tobacco Use     Smoking status: Never Smoker     Smokeless tobacco: Never Used     Tobacco comment: no passive smoke exposure   Substance Use Topics     Alcohol use: Yes     Comment: beer & wine rarely     Family History   Problem Relation Age of Onset     Colorectal Cancer Mother         mother passed in here 50's     Thyroid Disease No family hx of      Asthma No family hx of          Current Outpatient Medications   Medication Sig Dispense Refill     acetaminophen (TYLENOL) 325 MG tablet Take 1-2 tablets by mouth every 4 hours as needed.       ASPIRIN EC PO Take 162 mg by mouth daily       ANDERSON CONTOUR NEXT test strip USE TO TEST BLOOD SUGAR ONCE DAILY OR AS DIRECTED. 50 strip 6     CLEAR-ATADINE 10 MG tablet TAKE 1 TABLET (10 MG) BY MOUTH DAILY AS NEEDED 90 tablet 3     CONTOUR NEXT TEST test strip TEST ONCE DAILY 50 strip 3     cyclobenzaprine (FLEXERIL) 5 MG tablet Take 1 tablet (5 mg) by mouth 3 times daily as needed for muscle spasms 30 tablet 0     cycloSPORINE (RESTASIS)  "0.05 % ophthalmic emulsion Place 1 drop into both eyes 2 times daily       EASY TOUCH LANCETS 32G/TWIST MISC AS DIRECTED 100 each 2     glipiZIDE (GLUCOTROL XL) 10 MG 24 hr tablet TAKE 1 TABLET (10 MG) BY MOUTH DAILY 90 tablet 0     hydrochlorothiazide (HYDRODIURIL) 25 MG tablet Take 1 tablet (25 mg) by mouth daily as needed (edema) 90 tablet 3     omeprazole (PRILOSEC) 20 MG DR capsule Take 1 capsule (20 mg) by mouth daily as needed (reflux) 90 capsule 3     order for DME Incontinence pads, active style, up to 3 daily 100 Units prn     sertraline (ZOLOFT) 100 MG tablet TAKE 1 TABLET (100 MG) BY MOUTH DAILY 90 tablet 0     traZODone (DESYREL) 100 MG tablet Take 1 tablet (100 mg) by mouth At Bedtime 90 tablet 3     Allergies   Allergen Reactions     Hydrocodone Bitartrate      Meperidine Hcl      Morphine Sulfate      Recent Labs   Lab Test 12/16/19  1435 07/09/19  1616 10/11/18  1602 08/03/17  1609 05/04/17  1430   A1C  --  7.1* 6.6* 5.5 6.3*   LDL  --  103* 70  --  83   HDL  --  48* 53  --  54   TRIG  --  107 123  --  102   CR 0.64 0.88 0.90 0.75 1.14*   GFRESTIMATED 90 66 62 76 47*   GFRESTBLACK >90 76 75 >90   GFR Calc   57*   POTASSIUM 3.0* 4.4 4.3 3.9 4.0   TSH 1.50  --  1.49  --  1.72      BP Readings from Last 3 Encounters:   06/19/20 112/80   12/16/19 119/80   07/09/19 128/80    Wt Readings from Last 3 Encounters:   06/19/20 60.8 kg (134 lb)   12/16/19 77.1 kg (170 lb)   10/11/18 83.9 kg (185 lb)                      Reviewed and updated as needed this visit by Provider         Review of Systems   Constitutional, HEENT, cardiovascular, pulmonary, gi and gu systems are negative, except as otherwise noted.      Objective    /80 (BP Location: Right arm, Patient Position: Sitting, Cuff Size: Adult Regular)   Pulse 92   Temp 97.4  F (36.3  C) (Tympanic)   Ht 1.651 m (5' 5\")   Wt 60.8 kg (134 lb)   SpO2 96%   BMI 22.30 kg/m    Body mass index is 22.3 kg/m .  Physical Exam   GENERAL: " healthy, alert and no distress  EYES: closing right eye and left eye wanders. perrla direct and consensual.   NECK: no asymmetry, masses, or scars, thyroid normal to palpation and right side has a thumb sized   RESP: lungs clear to auscultation - no rales, rhonchi or wheezes  CV: regular rate and rhythm, normal S1 S2, no S3 or S4, no murmur, click or rub, no peripheral edema and peripheral pulses strong  ABDOMEN: soft, nontender, no hepatosplenomegaly, no masses and bowel sounds normal  MS: no gross musculoskeletal defects noted, no edema  SKIN: no suspicious lesions or rashes  NEURO: weakness is generalized. , sensory exam grossly normal and mentation intact  PSYCH: mentation appears normal, affect normal/bright    Diagnostic Test Results:  Labs reviewed in Epic  Results for orders placed or performed in visit on 06/19/20 (from the past 24 hour(s))   Comprehensive metabolic panel   Result Value Ref Range    Sodium 139 133 - 144 mmol/L    Potassium 3.6 3.4 - 5.3 mmol/L    Chloride 107 94 - 109 mmol/L    Carbon Dioxide PENDING 20 - 32 mmol/L    Anion Gap PENDING 3 - 14 mmol/L    Glucose PENDING 70 - 99 mg/dL    Urea Nitrogen PENDING 7 - 30 mg/dL    Creatinine PENDING 0.52 - 1.04 mg/dL    GFR Estimate PENDING >60 mL/min/[1.73_m2]    GFR Estimate If Black PENDING >60 mL/min/[1.73_m2]    Calcium PENDING 8.5 - 10.1 mg/dL    Bilirubin Total PENDING 0.2 - 1.3 mg/dL    Albumin PENDING 3.4 - 5.0 g/dL    Protein Total PENDING 6.8 - 8.8 g/dL    Alkaline Phosphatase PENDING 40 - 150 U/L    ALT PENDING 0 - 50 U/L    AST PENDING 0 - 45 U/L   CBC with platelets differential   Result Value Ref Range    WBC 11.4 (H) 4.0 - 11.0 10e9/L    RBC Count 5.12 3.8 - 5.2 10e12/L    Hemoglobin 14.6 11.7 - 15.7 g/dL    Hematocrit 43.8 35.0 - 47.0 %    MCV 86 78 - 100 fl    MCH 28.5 26.5 - 33.0 pg    MCHC 33.3 31.5 - 36.5 g/dL    RDW 13.7 10.0 - 15.0 %    Platelet Count 208 150 - 450 10e9/L    Diff Method Automated Method     % Neutrophils 75.6  %    % Lymphocytes 17.1 %    % Monocytes 6.5 %    % Eosinophils 0.2 %    % Basophils 0.2 %    % Immature Granulocytes 0.4 %    Nucleated RBCs 0 0 /100    Absolute Neutrophil 8.6 (H) 1.6 - 8.3 10e9/L    Absolute Lymphocytes 2.0 0.8 - 5.3 10e9/L    Absolute Monocytes 0.7 0.0 - 1.3 10e9/L    Absolute Eosinophils 0.0 0.0 - 0.7 10e9/L    Absolute Basophils 0.0 0.0 - 0.2 10e9/L    Abs Immature Granulocytes 0.1 0 - 0.4 10e9/L    Absolute Nucleated RBC 0.0    UA reflex to Microscopic and Culture - [place]    Specimen: Unspecified Urine   Result Value Ref Range    Color Urine Dark Yellow     Appearance Urine Cloudy     Glucose Urine Negative NEG^Negative mg/dL    Bilirubin Urine Negative NEG^Negative    Ketones Urine 10 (A) NEG^Negative mg/dL    Specific Gravity Urine 1.016 1.003 - 1.035    Blood Urine Trace (A) NEG^Negative    pH Urine 6.0 4.7 - 8.0 pH    Protein Albumin Urine 100 (A) NEG^Negative mg/dL    Urobilinogen mg/dL Normal 0.0 - 2.0 mg/dL    Nitrite Urine Positive (A) NEG^Negative    Leukocyte Esterase Urine Large (A) NEG^Negative    Source Unspecified Urine     RBC Urine >182 (H) 0 - 2 /HPF    WBC Urine >182 (H) 0 - 5 /HPF    WBC Clumps Present (A) NEG^Negative /HPF    Bacteria Urine Many (A) NEG^Negative /HPF    Mucous Urine Present (A) NEG^Negative /LPF           Assessment & Plan     1. UTI symptoms  cipro will be given. Has mild increase in WBC as well. ? Early pylo.   - UA reflex to Microscopic and Culture - [place]  - Urine Culture Aerobic Bacterial  - ciprofloxacin (CIPRO) 250 MG tablet; Take 1 tablet (250 mg) by mouth 2 times daily  Dispense: 20 tablet; Refill: 0    2. Unintended weight loss  New for her with her neuro sx and her sugar being so low today should be worked up further. Start on supplement if able to tolerate. We did get some labs but really has need for follow up.   - Comprehensive metabolic panel  - CBC with platelets differential    3. Type 2 diabetes mellitus without complication, without  long-term current use of insulin (H)  She is going to be rechecked in Dr. Bowling office.   - Hemoglobin A1c    4. Mass of right side of neck  Feels soft but not movable. Concern on this. No bruit heard in carotid when listening.   - US Head Neck Soft Tissue; Future    5. Double vision  Has to close one eye in visit just to see. Other eye does wander and she states that is the eye she can see out of.  Hx of brain mass with surgery. We will go from there and see if this is something we can take care of for her.    - MR Brain w Contrast; Future       See Patient Instructions    No follow-ups on file.    Padma Vyas PA-C  [facility] - [facility]

## 2020-06-18 NOTE — Clinical Note
I am out in the afternoon - patient needs in person visit (afternoon due to positive screen with nausea) with labs and possible imaging, we can talk tomorrow in the morning if you would like more info

## 2020-06-19 NOTE — NURSING NOTE
"Chief Complaint   Patient presents with     multiple symptoms       Initial There were no vitals taken for this visit. Estimated body mass index is 28.73 kg/m  as calculated from the following:    Height as of 12/16/19: 1.638 m (5' 4.5\").    Weight as of 12/16/19: 77.1 kg (170 lb).  Medication Reconciliation: complete  Anastasia Madrid LPN  "

## 2020-06-29 NOTE — PROGRESS NOTES
Subjective     Nilda Ramirez is a 70 year old female who presents to clinic today for the following health issues:    HPI   URINARY TRACT SYMPTOMS      Duration: 2 week follow up    Description  follow-up UTI    Intensity:  mild    Accompanying signs and symptoms:  Fever/chills: no   Flank pain no   Nausea and vomiting: no   Vaginal symptoms: none  Abdominal/Pelvic Pain: no     History  History of frequent UTI's: no   History of kidney stones: no   Sexually Active: no   Possibility of pregnancy: No    Precipitating or alleviating factors: None    Therapies tried and outcome: course of antibiotics - cipro   Outcome: urine clear    Patient continues to have double vision and hearing loss.  She notes both symptoms had sudden onset.  She is scheduled for ultrasound of soft tissue mass of neck and MRI of brain, but not until 7/13.    Patient Active Problem List   Diagnosis     Advanced care planning/counseling discussion     Type 2 diabetes mellitus without complication, without long-term current use of insulin (H)     Benign neoplasm of cranial nerve (H)     Schwannoma of cranial nerve (H)     Closed fracture of cervical vertebra (H)     Anemia     Anxiety state     Arthropathy     History of pulmonary embolism     Major depressive disorder, recurrent episode (H)     Esophageal reflux     Essential hypertension     History of disease of skin and subcutaneous tissue     Insomnia     Astigmatism     Dry eyes     Exposure keratopathy     Hypermetropia     Presbyopia     Past Surgical History:   Procedure Laterality Date     ARTHROSCOPY KNEE  1996    bilat total knees     BACK SURGERY  2006     BIOPSY BREAST  2009     BIOPSY BREAST  2010     BRAIN TUMOR RESECTION  2000    Acoustic neuroma/schwanoma     COLONOSCOPY  2007    repeat 10 yrs     D&C  1990     deafness in left ear       knee replacement  2/2013    right     LAPAROSCOPIC CHOLECYSTECTOMY WITH CHOLANGIOGRAMS N/A 8/7/2017    Procedure: LAPAROSCOPIC CHOLECYSTECTOMY  WITH CHOLANGIOGRAMS;  LAPAROSCOPIC CHOLECYSTECTOMY WITH  CHOLANGIOGRAMS;  Surgeon: Huma Pierre MD;  Location: [facility]     Left total knee replacement  11/19/2012    left - Degenerative disc disease     Multi-level cervical spine fusion      Cervical spine multi-level fracture     Right knee cortisone injection  11/26/2012     total knee arthroplasty  2/15/2013    right - knee arthrosis, severe valgus       Social History     Tobacco Use     Smoking status: Never Smoker     Smokeless tobacco: Never Used     Tobacco comment: no passive smoke exposure   Substance Use Topics     Alcohol use: Yes     Comment: beer & wine rarely     Family History   Problem Relation Age of Onset     Colorectal Cancer Mother         mother passed in here 50's     Thyroid Disease No family hx of      Asthma No family hx of          Current Outpatient Medications   Medication Sig Dispense Refill     acetaminophen (TYLENOL) 325 MG tablet Take 1-2 tablets by mouth every 4 hours as needed.       ASPIRIN EC PO Take 162 mg by mouth daily       ANDERSON CONTOUR NEXT test strip USE TO TEST BLOOD SUGAR ONCE DAILY OR AS DIRECTED. 50 strip 6     CLEAR-ATADINE 10 MG tablet TAKE 1 TABLET (10 MG) BY MOUTH DAILY AS NEEDED 90 tablet 3     CONTOUR NEXT TEST test strip TEST ONCE DAILY 50 strip 3     cyclobenzaprine (FLEXERIL) 5 MG tablet Take 1 tablet (5 mg) by mouth 3 times daily as needed for muscle spasms 30 tablet 0     cycloSPORINE (RESTASIS) 0.05 % ophthalmic emulsion Place 1 drop into both eyes 2 times daily       EASY TOUCH LANCETS 32G/TWIST MISC AS DIRECTED 100 each 2     glipiZIDE (GLUCOTROL XL) 10 MG 24 hr tablet TAKE 1 TABLET (10 MG) BY MOUTH DAILY 90 tablet 0     hydrochlorothiazide (HYDRODIURIL) 25 MG tablet Take 1 tablet (25 mg) by mouth daily as needed (edema) 90 tablet 3     omeprazole (PRILOSEC) 20 MG DR capsule Take 1 capsule (20 mg) by mouth daily as needed (reflux) 90 capsule 3     order for DME Incontinence pads, active style, up to 3  daily 100 Units prn     sertraline (ZOLOFT) 100 MG tablet TAKE 1 TABLET (100 MG) BY MOUTH DAILY 90 tablet 0     traZODone (DESYREL) 100 MG tablet Take 1 tablet (100 mg) by mouth At Bedtime 90 tablet 3     Allergies   Allergen Reactions     Hydrocodone Bitartrate      Meperidine Hcl      Morphine Sulfate        Reviewed and updated as needed this visit by Provider  Tobacco  Allergies  Meds  Problems  Med Hx  Surg Hx  Fam Hx         Review of Systems   Constitutional, HEENT, cardiovascular, pulmonary, gi and gu systems are negative, except as otherwise noted.      Objective    /60   Pulse 83   Temp 97.5  F (36.4  C)   SpO2 95%   There is no height or weight on file to calculate BMI.  Physical Exam   GENERAL: alert, no distress and appears older than stated age  EYES: PERRL, initially appears that right eye is drifting, patient frequently closes right eye in order to focus, but left eye appears to drift more when patient is trying to focus  PSYCH: mentation appears normal, affect normal/bright    Diagnostic Test Results:  Labs reviewed in Epic        Assessment & Plan     1. UTI symptoms  Will recheck UA  - UA reflex to Microscopic and Culture - [place]    2. Double vision  Will reorder scans as Urgent, I think they need to be completed this week.  - Albumin Random Urine Quantitative with Creat Ratio  - MR Brain w/o & w Contrast  - MR Brain w/o & w Contrast; Future    3. Sudden right hearing loss  As above.  - MR Brain w/o & w Contrast; Future    4. Mass of right side of neck  As above.  - US Head Neck Soft Tissue; Future    5. Unintended weight loss  As above.  - MR Brain w/o & w Contrast; Future       Over 30 minutes are spent with the patient, over 20 minutes of which was in education and counseling regarding current conditions and treatment/therapy options/risks/benefits/etc, including review of recent symptoms and notes, reviewing testing ordered, discussion of possible findings, and future  planning.      Return for Follow-up after scans completed (appointment TBD).    Jennifer Bowling MD  [facility]

## 2020-06-30 NOTE — NURSING NOTE
"Chief Complaint   Patient presents with     Weight Check     UTI       Initial /60   Pulse 83   Temp 97.5  F (36.4  C)   SpO2 95%  Estimated body mass index is 22.3 kg/m  as calculated from the following:    Height as of 6/19/20: 1.651 m (5' 5\").    Weight as of 6/19/20: 60.8 kg (134 lb).  Medication Reconciliation: complete  Neal Hope LPN  "

## 2020-07-13 NOTE — ED NOTES
Pt up to bathroom with walker and assist of 1 to attempt to obtain UA, was unable to void. Pt tolerated transfer well. Per Dr. Larson, ok to straight cath for UA. Pt to go down for CT at this time.

## 2020-07-13 NOTE — ED PROVIDER NOTES
"  History     Chief Complaint   Patient presents with     Generalized Weakness     stating she was supposed to have a head MRI and a biopsy of a lump on her chest today. states that she got to MRI and they didn;t have her scheduled and was told to come here. stating \"I can't go back to [place] withotu the MRI and Biopsy\" and stating she is weak     HPI  Nilda Ramirez is a 70 year old female who who presents today with complaints of generalized weakness.  Patient states that she has had symptoms for several weeks.  Was scheduled today to have an MRI of her brain and a biopsy of a neck mass.  Reportedly test was scheduled for July 1 but patient missed her appointment.  Patient angry that MRI could not be done today and due to complains of weakness and was asked to come to the ER.  Patient has otherwise been in usual state of health. Has dysconjugate eye movements which she states has been presents for many weeks. Patient very hard of hearing.      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     Priority: Medium     Hypermetropia 05/30/2012     Priority: Medium     Presbyopia 05/30/2012     Priority: Medium     Type 2 diabetes mellitus without complication, without long-term current use of insulin (H) 05/04/2012     Priority: Medium     Benign neoplasm of cranial nerve (H) 05/04/2012     Priority: Medium     Schwannoma of cranial nerve (H) 05/04/2012     Priority: Medium     Closed fracture of cervical vertebra (H) 05/04/2012     Priority: Medium     Anemia 05/04/2012     Priority: Medium     " Anxiety state 05/04/2012     Priority: Medium     Arthropathy 05/04/2012     Priority: Medium     History of pulmonary embolism 05/04/2012     Priority: Medium     with DVT in 2000       Major depressive disorder, recurrent episode (H) 05/04/2012     Priority: Medium     Esophageal reflux 05/04/2012     Priority: Medium     Essential hypertension 05/04/2012     Priority: Medium     History of disease of skin and subcutaneous tissue 05/04/2012     Priority: Medium        Past Medical History:    Past Medical History:   Diagnosis Date     Acoustic neuroma 5/4/2012     Anemia 5/4/2012     Anxiety state, unspecified 5/4/2012     Arthritis 5/4/2012     Cervical spine fracture 5/4/2012     Depressive disorder      Diabetes mellitus without mention of complication, 5/4/2012     Esophageal reflux 5/4/2012     History of pulmonary embolism 5/4/2012     History of rectal abscess 5/4/2012     Insomnia 12/29/2014     Major depressive affective disorder, recurrent epi 5/4/2012     Schwannoma 5/4/2012     Unspecified essential hypertension 5/4/2012       Past Surgical History:    Past Surgical History:   Procedure Laterality Date     ARTHROSCOPY KNEE  1996    bilat total knees     BACK SURGERY  2006     BIOPSY BREAST  2009     BIOPSY BREAST  2010     BRAIN TUMOR RESECTION  2000    Acoustic neuroma/schwanoma     COLONOSCOPY  2007    repeat 10 yrs     D&C  1990     deafness in left ear       knee replacement  2/2013    right     LAPAROSCOPIC CHOLECYSTECTOMY WITH CHOLANGIOGRAMS N/A 8/7/2017    Procedure: LAPAROSCOPIC CHOLECYSTECTOMY WITH CHOLANGIOGRAMS;  LAPAROSCOPIC CHOLECYSTECTOMY WITH  CHOLANGIOGRAMS;  Surgeon: Huma Pierre MD;  Location: [facility]     Left total knee replacement  11/19/2012    left - Degenerative disc disease     Multi-level cervical spine fusion      Cervical spine multi-level fracture     Right knee cortisone injection  11/26/2012     total knee arthroplasty  2/15/2013    right - knee arthrosis, severe valgus        Family History:    Family History   Problem Relation Age of Onset     Colorectal Cancer Mother         mother passed in here 50's     Thyroid Disease No family hx of      Asthma No family hx of        Social History:  Marital Status:  Single [1]  Social History     Tobacco Use     Smoking status: Never Smoker     Smokeless tobacco: Never Used     Tobacco comment: no passive smoke exposure   Substance Use Topics     Alcohol use: Yes     Comment: beer & wine rarely     Drug use: No        Medications:    acetaminophen (TYLENOL) 325 MG tablet  ASPIRIN EC PO  ANDERSON CONTOUR NEXT test strip  CLEAR-ATADINE 10 MG tablet  CONTOUR NEXT TEST test strip  cyclobenzaprine (FLEXERIL) 5 MG tablet  cycloSPORINE (RESTASIS) 0.05 % ophthalmic emulsion  EASY TOUCH LANCETS 32G/TWIST MISC  glipiZIDE (GLUCOTROL XL) 10 MG 24 hr tablet  hydrochlorothiazide (HYDRODIURIL) 25 MG tablet  omeprazole (PRILOSEC) 20 MG DR capsule  order for DME  sertraline (ZOLOFT) 100 MG tablet  traZODone (DESYREL) 100 MG tablet          Review of Systems   Constitutional: Negative.  Negative for fever.   HENT: Negative.    Eyes: Negative.    Respiratory: Negative.    Cardiovascular: Negative.  Negative for chest pain.   Gastrointestinal: Negative.    Endocrine: Negative.    Genitourinary: Negative.    Musculoskeletal: Negative.  Negative for myalgias, neck pain and neck stiffness.   Skin: Negative.    Allergic/Immunologic: Negative.    Neurological: Positive for weakness.   Hematological: Negative.    Psychiatric/Behavioral: Negative.  Negative for self-injury, sleep disturbance and suicidal ideas. The patient is not nervous/anxious.        Physical Exam   BP: 105/64  Pulse: 74  Temp: 97.3  F (36.3  C)  Resp: 18  SpO2: 97 %      Physical Exam  Constitutional:       General: She is not in acute distress.     Appearance: She is normal weight.   HENT:      Head: Normocephalic.   Neck:      Musculoskeletal: Normal range of motion and neck supple. No neck  rigidity.   Cardiovascular:      Rate and Rhythm: Normal rate.      Pulses: Normal pulses.   Pulmonary:      Effort: Pulmonary effort is normal.   Abdominal:      General: Abdomen is flat.   Musculoskeletal: Normal range of motion.   Lymphadenopathy:      Cervical: No cervical adenopathy.   Skin:     General: Skin is warm.      Capillary Refill: Capillary refill takes less than 2 seconds.   Neurological:      Mental Status: She is alert.      Motor: Weakness present.      Comments: Disconjugate eye movements noted    5 out of 5 muscle strength bilaterally upper and lower extremities   Psychiatric:         Mood and Affect: Mood normal.         Behavior: Behavior normal.         Thought Content: Thought content normal.         Judgment: Judgment normal.         ED Course     ED Course as of Jul 13 1957   Mon Jul 13, 2020   1400 Patient ambulated without difficulty.      1601 Case discussed with the neurosurgeon on-call.  She states that patient needs follow-up in the neurosurgery clinic.  Does not need admission at this time.        Procedures          EKG - NSR with LAD. No ST elevation.      Results for orders placed or performed during the hospital encounter of 07/13/20 (from the past 24 hour(s))   CBC with platelets differential   Result Value Ref Range    WBC 7.5 4.0 - 11.0 10e9/L    RBC Count 5.02 3.8 - 5.2 10e12/L    Hemoglobin 14.3 11.7 - 15.7 g/dL    Hematocrit 44.3 35.0 - 47.0 %    MCV 88 78 - 100 fl    MCH 28.5 26.5 - 33.0 pg    MCHC 32.3 31.5 - 36.5 g/dL    RDW 13.8 10.0 - 15.0 %    Platelet Count 148 (L) 150 - 450 10e9/L    Diff Method Automated Method     % Neutrophils 81.5 %    % Lymphocytes 10.9 %    % Monocytes 6.6 %    % Eosinophils 0.3 %    % Basophils 0.3 %    % Immature Granulocytes 0.4 %    Nucleated RBCs 0 0 /100    Absolute Neutrophil 6.1 1.6 - 8.3 10e9/L    Absolute Lymphocytes 0.8 0.8 - 5.3 10e9/L    Absolute Monocytes 0.5 0.0 - 1.3 10e9/L    Absolute Eosinophils 0.0 0.0 - 0.7 10e9/L     Absolute Basophils 0.0 0.0 - 0.2 10e9/L    Abs Immature Granulocytes 0.0 0 - 0.4 10e9/L    Absolute Nucleated RBC 0.0    Basic metabolic panel   Result Value Ref Range    Sodium 138 133 - 144 mmol/L    Potassium 3.4 3.4 - 5.3 mmol/L    Chloride 104 94 - 109 mmol/L    Carbon Dioxide 24 20 - 32 mmol/L    Anion Gap 10 3 - 14 mmol/L    Glucose 145 (H) 70 - 99 mg/dL    Urea Nitrogen 11 7 - 30 mg/dL    Creatinine 0.72 0.52 - 1.04 mg/dL    GFR Estimate 84 >60 mL/min/[1.73_m2]    GFR Estimate If Black >90 >60 mL/min/[1.73_m2]    Calcium 9.1 8.5 - 10.1 mg/dL   Troponin I   Result Value Ref Range    Troponin I ES <0.015 0.000 - 0.045 ug/L   MR Brain w/o & w Contrast    Narrative    EXAM:  MR BRAIN W/O & W CONTRAST    HISTORY:  Originally scheduled for July 1.  History of double vision  and weakness.  Rule out mass. .    TECHNIQUE:  Sagittal T1, axial T1, T2, FLAIR, diffusion, gradient as  well as axial and coronal T1 fat saturated postcontrast imaging of the  whole brain was performed.    MEDS/CONTRAST: Gadavist   5.5  mL    COMPARISON:  MR brain 10/18/2018     FINDINGS:    Abnormal, locally invasive enhancement is present at the right margin  of the skull base, extending into the occipital condyle and clivus as  well as the right os.  The foramen magnum. There is thickening of the thoracic and the road  right posterior fossa and including the right tentorium, which may be  reactive or reflect correct intracranial invasion. The right sigmoid  sinus and proximal right jugular flow voids are absent, suggesting of  invasion and occlusion, new since the prior study from 2018. Tumor  involves the jugular and accessory foramen.     No abnormal extra-axial collection, hydrocephalus, mass effect or  midline shift is seen. The basal cisterns are preserved.    The parenchymal signal is within normal limits. No abnormal  intracranial enhancement or concerning T2* gradient susceptibility is  identified. No restricted diffusion is  present.  The major  intracranial vascular flow voids are preserved.    The T1 marrow signal is unremarkable. The orbits are intact. The  mastoid air cells and visualized paranasal sinuses demonstrate no  abnormal fluid.        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    Right posterior fossa dural thickening and enhancement may reflect  reactive change or additional local spread of tumor.     Involvement of the jugular and accessory foramen. Correlate for  evidence of cranial neuropathies, 9 through 12.    Differential considerations favor a glomus jugulare paraganglioma or  skull base metastasis. Recommend follow-up CT neck with contrast to  assess bony changes as well as any evidence of a primary neck mass. MR  neck or skull base may provide some complementary information.  Recommend neurosurgical consultation.    VIVIEN RAMIREZ MD   XR Chest 1 View    Narrative    PROCEDURE:  XR CHEST 1 VW    HISTORY:  Patient with complaints of weakness. Rule out pneumonia.     COMPARISON:  None.    FINDINGS:   The cardiac silhouette is normal in size. The pulmonary vasculature is  normal.  The lungs are clear. No pleural effusion or pneumothorax.      Impression    IMPRESSION:  No acute cardiopulmonary disease.      ISRRAEL CLEMENT MD   CT Chest w Contrast    Narrative    PROCEDURE: CT CHEST W CONTRAST 7/13/2020 2:21 PM    HISTORY: Patient with mass in right side base of skull on MRI.  Rule  out metastasis vs primary mass    COMPARISONS: None.    Meds/Dose Given: ISOVUE 300  100ML    TECHNIQUE: CT scan of the chest with IV contrast with sagittal coronal  reconstructions    FINDINGS: There is a linear area of increased density in the right  middle lobe with some focal thickening within the linear opacity  measuring 8 mm in maximal thickness. Most likely this area of  increased density represents  scarring. No other pulmonary nodules are  noted. The hilar and mediastinal lymph nodes are normal in caliber.  There is a markedly enlarged right axillary lymph node measuring 3 cm  in diameter. There are some mildly enlarged right axillary lymph nodes  the largest measuring 12 mm. The heart is normal in size. The upper  portion of the liver and spleen appear normal. There is abnormal soft  tissue thickening surrounding the upper pole of the left kidney. This  increased density has worsened as compared to December 2019.  Degenerative changes present in the thoracic spine.         Impression    IMPRESSION: Axillary lymphadenopathy right worse than left. Increasing  soft tissue density surrounding the upper pole of the left kidney. The  left kidney was incompletely visualized    ISRRAEL CLEMENT MD   CT Soft Tissue Neck w Contrast    Narrative    PROCEDURE: CT SOFT TISSUE NECK W CONTRAST 7/13/2020 2:22 PM    HISTORY: Patient with mass in right side base of skull on MRI.  Rule  out metastasis vs primary mass    COMPARISONS:    Meds/Dose Given: ISOVUE 300  50ML    TECHNIQUE: CT scan of the neck with IV contrast sagittal coronal  reconstructions were obtained.    FINDINGS: There is a 5 cm diameter mass at the right skull base. There  is associated bony destruction in the temporal bone on the right.  There is a mass in the right side of the tongue. The mass extends  posteriorly and downward into the vallecula. Postoperative changes are  seen in the cervical spine. The salivary glands are normal. The larynx  and upper trachea is normal. The thyroid gland is normal. There is an  enlarged right-sided cervical lymph node in the posterior cervical  triangle measuring 2 cm in diameter. There is a left jugular chain  lymph node measuring 17 mm in diameter.         Impression    IMPRESSION: Right-sided tongue mass. Bilateral cervical  lymphadenopathy. Large mass at the base of the skull on the right with  a right temporal bone   partial distraction.    ISRRAEL CLEMENT MD   UA with Microscopic   Result Value Ref Range    Color Urine Yellow     Appearance Urine Clear     Glucose Urine Negative NEG^Negative mg/dL    Bilirubin Urine Negative NEG^Negative    Ketones Urine 40 (A) NEG^Negative mg/dL    Specific Gravity Urine >1.050 (H) 1.003 - 1.035    Blood Urine Negative NEG^Negative    pH Urine 6.0 4.7 - 8.0 pH    Protein Albumin Urine 10 (A) NEG^Negative mg/dL    Urobilinogen mg/dL 2.0 0.0 - 2.0 mg/dL    Nitrite Urine Negative NEG^Negative    Leukocyte Esterase Urine Negative NEG^Negative    Source Catheterized Urine     WBC Urine 0 0 - 5 /HPF    RBC Urine 0 0 - 2 /HPF    Bacteria Urine None (A) NEG^Negative /HPF    Squamous Epithelial /HPF Urine 4 (H) 0 - 1 /HPF    Mucous Urine Present (A) NEG^Negative /LPF       Medications   sodium chloride 0.9% infusion (has no administration in time range)   0.9% sodium chloride BOLUS (has no administration in time range)     Followed by   sodium chloride 0.9% infusion (has no administration in time range)       Assessments & Plan (with Medical Decision Making)     70 year old with complaints of dyscongugate eye movements and generalized weakness for several weeks. Brain MRI showing a complex tumor. Case discussed with neurosurgeon who states that findings do not represent an acute emergency. States patient will be called at home for an appointment this week but patient needs an actual consult sent to Neurosurgery clinic.    Patient worked up for weakness. But no clear reason found. Patient well appearing otherwise and able to ambulate without diff. Lives with a friend and is comfortable going home. Results of MRI and CT reviewed with patient. She understands she has a tumor and needs follow up and possible surgery. Understands to return if she develops new or worsening symptoms.     Message sent to PCP to send referral to Neurosurgery clinic.     Due to the nature of this electronic medical record,  laboratory results, imaging results, diagnosis, other information and medications reported above may not represent information available to me at the the time of my care and disposition. Medications reported above may have not been ordered by me.     Portions of the record may have been created with voice recognition software. Occasional wrong-word or 'sound-a- like' substitution may have occurred due to the inherent limitations of voice recognition software. Though the chart has been reviewed, there may be inadvertent transcription errors. Read the chart carefully and recognize, using context, where substitutions have occurred.       New Prescriptions    No medications on file       Final diagnoses:   Brain tumor (H)       7/13/2020   [facility]     Mindy Horton MD  07/16/20 0236

## 2020-07-13 NOTE — ED AVS SNAPSHOT
[facility]  [address]  [place] [address]  Phone:  [phone]                                    Nilda Ramirez   MRN: [id number]    Department:  [facility]   Date of Visit:  7/13/2020           After Visit Summary Signature Page    I have received my discharge instructions, and my questions have been answered. I have discussed any challenges I see with this plan with the nurse or doctor.    ..........................................................................................................................................  Patient/Patient Representative Signature      ..........................................................................................................................................  Patient Representative Print Name and Relationship to Patient    ..................................................               ................................................  Date                                   Time    ..........................................................................................................................................  Reviewed by Signature/Title    ...................................................              ..............................................  Date                                               Time          22EPIC Rev 08/18

## 2020-07-13 NOTE — DISCHARGE INSTRUCTIONS
1) You will be contacted with the dates for your neurosurgery appointment.   2) If you have not been contacted by the neurosurgery clinic this week, contact your doctor for a referral.  3) Follow the aftercare instructions provided.   4) If you develop new or worsening symptoms, return to the ER immediately.       What to expect when you have contrast    During your exam, we will inject  contrast  into your vein or artery. (Contrast is a clear liquid with iodine in it. It shows up on X-rays.)    You may feel warm or hot. You may have a metal taste in your mouth and a slight upset stomach. You may also feel pressure near the kidneys and bladder. These effects will last about 1 to 3 minutes.    Please tell us if you have:   Sneezing    Itching   Hives    Swelling in the face   A hoarse voice   Breathing problems   Other new symptoms    Serious problems are rare.  They may include:   Irregular heartbeat    Seizures   Kidney failure             Tissue damage   Shock     Death    If you have any problems during the exam, we  will treat them right away.    When you get home    Call your hospital if you have any new symptoms in the next 2 days, like hives or swelling. (Phone numbers are at the bottom of this page.) Or call your family doctor.     If you have wheezing or trouble breathing, call 911.    Self-care  -Drink at least 4 extra glasses of water today.   This reduces the stress on your kidneys.  -Keep taking your regular medicines.    The contrast will pass out of your body in your  Urine(pee). This will happen in the next 24 hours. You  will not feel this. Your urine will not  change color.    If you have kidney problems or take metformin    Drink 4 to 8 large glasses of water for the next  2 days, if you are not on a fluid restriction.    ?If you take metformin (Glucophage or Glucovance) for diabetes, keep taking it.      ?Your kidney function tests are abnormal.  If you take Metformin, do not take it for 48  hours. Please go to your clinic for a blood test within 3 days after your exam before the restarting this medicine.     (Note to provider:please give patient prescription for lab tests.)    ?Special instructions: -    I have read and understand the above information.    Patient Sign Here:______________________________________Date:________Time:______    Staff Sign Here:________________________________________Date:_______Time:______      Radiology Departments:     ?[facility]: [phone] ?[place]: [phone]     ?[place]: [phone] ?[facility]:[phone]      ?[facility]: [phone]  ?[facility]: [phone]  ?[facility]:[phone]    ?[facility]:[phone]  ?[facility]:[phone]

## 2020-07-13 NOTE — ED NOTES
Emergency Department Assessment  Reason for Referral: Weak Elderly  PCP: Jennifer Bowling  Specialists or MH providers: no  [facility]  Medication routine/concerns no  Cognitive Behavioral Status: awake, alert and oriented    Affect and Mood Status:     Problems sleeping: na    Mental Health History: Depression  Recent Stressors: Health issues    Case Manger/: Maren Lockhart    Support System: Friend and Roommate Elidia  Transportation: Roommate drives    Current Living Situation:    Home Setting: own home   Living Situation:lives with room mate   Function Status/Assistive Device: no assistive devices    Employment/Financial/Insurance Concerns:retired     No Insurance issues identified     Patient's insurance coverage: [unfilled]    [place] Status/[facility]: no  Health Care Directive: na  Previous Services at Home or in the Community:has lawn and snow removal services, housekeeping x2 per week  Falls at home?: just weak, no falls    ADL's:independent  Equipment at home?: access to walker if needed, shower chair  O2: no  Smoker: no  ETOH: no  THC/Drugs: no    Any Violence in the home?: no  Do you feel safe at home?: yes      Plan: home    I will make a follow up call this week.      BHARATI Wilkins

## 2020-07-13 NOTE — ED NOTES
Pt departed ED with [facility] at this time. [facility]'s updated that pt is en route to their facility.

## 2020-07-13 NOTE — ED NOTES
Pt here with friend from  where she went for an MRI/biopsy of her R neck this morning and found out she did not have an appt scheduled. Pt notified DI staff that she did not want to leave until she got her MRI because she has been feeling weak at home so she was sent here. Pt transferred to cot independently. States that she took a fall last year and since then she has had decreased hearing in R ear and has a lump growing on the R side of her neck. She has seen her PCP for this issue and MRI was ordered for 7/1/2020 but she missed this appt because she thought the MRI was scheduled for today.

## 2020-07-14 NOTE — TELEPHONE ENCOUNTER
9:15 AM    Reason for Call: Phone Call    Description: A nurse named Fady called from Neuro Surgery department in [place] stating the pt cannot be seen in their department and needs a referral to a neuro surgeon that deals with skull based tumors from Dr. Bowling. Please call Fady back to discuss this. Thank you.    Was an appointment offered for this call? No  If yes : Appointment type              Date    Preferred method for responding to this message: Telephone Call  What is your phone number ? [phone]    If we cannot reach you directly, may we leave a detailed response at the number you provided? No    Can this message wait until your PCP/provider returns, if available today?Provider in today    Fabi Naidu

## 2020-07-15 NOTE — TELEPHONE ENCOUNTER
Patient called again today in regards to a referral for Neurosurgery?  One was not placed by ED provider.  Please advise.

## 2020-07-15 NOTE — TELEPHONE ENCOUNTER
Pt was in the ER on July 13/ pt has brain tumor.      She was under the pretense the ER was going to refer her to a Neurosurgeon, but she has not heard from anyone.     Pt is now asking Dr. Mendez to advocate for her and refer her to Neuro as soon as possible. She is not wanting to wait for any length of time.

## 2020-07-16 NOTE — TELEPHONE ENCOUNTER
Called [facility] to make sure referral was received and it is.  Called patient to let her know that I made the confirmation of sent/received referral, it can take 48-72 hrs to hear back, and also provided number to call if she does not hear back by then.

## 2020-07-16 NOTE — TELEPHONE ENCOUNTER
The original message asking for a new referral disappeared out of my box, I'm not sure what happened.  The ER referred her to [facility], and after they reviewed her case, she will need to be referred to the [facility].  Referral entered for [facility].

## 2020-07-16 NOTE — TELEPHONE ENCOUNTER
----- Message from Mindy Horton MD sent at 7/13/2020  5:10 PM CDT -----  Regarding: Referral to Neurosurgery  Hi, I saw this patient in the ER.  Was able to get an MRI of the brain.  It seems like she has a complex tumor.  Spoke with neurosurgery who stated that they will see her in their clinic but they need a referral/consult request. Are you able to generate a referral and send to the neurosurgery clinic at [facility]?    Dr. Horton

## 2020-07-27 NOTE — TELEPHONE ENCOUNTER
FUTURE VISIT INFORMATION      FUTURE VISIT INFORMATION:    Date: 7/29/2020    Time: 8am    [place] [facility] [place]:    Referring provider:  Padma Vyas     Referring providers clinic:  [facility]     Reason for visit/diagnosis  Brain Mass     RECORDS REQUESTED FROM:       Clinic name Comments Records Status Imaging Status   Internal ENRIQUE Vyas-6/19/2020    CTA Head/Neck 7/27/2020  MR Brain 7/13/2020, 10/18/2018  CT Head 12/16/2019 Epic PACS
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
## 2020-07-29 NOTE — LETTER
July 29, 2020       TO: Nilda Ramirez  [address] [facility] [address]       DearMs.James,    We are writing to inform you of your test results.    {[facility] results letter list:[id number]}    No results found from the In Basket message.    ***

## 2020-07-29 NOTE — PROGRESS NOTES
"Nilda Ramirez is a 70 year old female who is being evaluated via a billable telephone visit.      The patient has been notified of following:     \"This telephone visit will be conducted via a call between you and your physician/provider. We have found that certain health care needs can be provided without the need for a physical exam.  This service lets us provide the care you need with a short phone conversation.  If a prescription is necessary we can send it directly to your pharmacy.  If lab work is needed we can place an order for that and you can then stop by our lab to have the test done at a later time.    Telephone visits are billed at different rates depending on your insurance coverage. During this emergency period, for some insurers they may be billed the same as an in-person visit.  Please reach out to your insurance provider with any questions.    If during the course of the call the physician/provider feels a telephone visit is not appropriate, you will not be charged for this service.\"    Patient has given verbal consent for Telephone visit?  yes    What phone number would you like to be contacted at? [phone]    How would you like to obtain your AVS? patricia Kumar, EMT      "
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
## 2020-07-29 NOTE — PATIENT INSTRUCTIONS
Patient will be scheduled for US Biopsy of a Lymph Node.  Patient will get a call for Covid Testing 3-4 day prior to procedure.    Call Margaret RN for questions/concerns

## 2020-08-03 NOTE — TELEPHONE ENCOUNTER
RECORDS STATUS - ALL OTHER DIAGNOSIS      RECORDS RECEIVED FROM: INTERNAL   DATE RECEIVED: 8/10/20   NOTES STATUS DETAILS   OFFICE NOTE from referring provider YES INTERNAL   OFFICE NOTE from medical oncologist YES INTERNAL   DISCHARGE SUMMARY from hospital NA    DISCHARGE REPORT from the ER YES 07/13/20   OPERATIVE REPORT NA    MEDICATION LIST YES INTERNAL   CLINICAL TRIAL TREATMENTS TO DATE NA    LABS YES EPIC   PATHOLOGY REPORTS NA    ANYTHING RELATED TO DIAGNOSIS YES INTERNAL   GENONOMIC TESTING NA    TYPE:     IMAGING (NEED IMAGES & REPORT) YES    CT SCANS YES 7/28/2020   MRI NA    MAMMO NA    ULTRASOUND YES 05/23/2017   PET NA

## 2020-08-04 NOTE — TELEPHONE ENCOUNTER
Call from patient requesting to speak with Dr. Bowling's nurse relating to referrals to [facility].     Patient may be reached at [phone]

## 2020-08-04 NOTE — TELEPHONE ENCOUNTER
Nilda's eyes are getting matted together, would like some drops.  Please review the notes from 07/29/20.  She is wanting to have the procedures he recommended done here or in [place].  Let me know what you think and I will call back.

## 2020-08-05 NOTE — TELEPHONE ENCOUNTER
Would one of our surgeons or Radiology do the ultrasound guided biopsy of the neck mass?  Antibiotic eye drops sent to pharmacy.

## 2020-08-05 NOTE — TELEPHONE ENCOUNTER
Pt updated on ABX eye drops sent. Did not discuss the other half of message,    Please call pt back.      Frida Joseph RN

## 2020-08-07 NOTE — TELEPHONE ENCOUNTER
Spoke with pt and discussed that we are trying to get her procedure done here and are working on what we need to do to get this done.

## 2020-08-07 NOTE — TELEPHONE ENCOUNTER
Call from Thalia, patients friend who is caring for the patient during this time.     Thalia has not heard from the Neurosurgery Dept on referral. Placed a call last week, spoke with didier Song in Scheduling and was suppose to receive a return call. No call has been returned or missed as Thalia has caller ID. Upset and crying on the phone.     Please advise and assist.     [phone].

## 2020-08-10 NOTE — TELEPHONE ENCOUNTER
I received a message to call Thalia who is currently caring for Nilda. Thalia stated that Nilda is unable to hear and unable to come to [place], so she is cancelling appt with Dr Rivera today at 8am. Thalia said Nilda is receiving other treatment closer.

## 2020-08-17 NOTE — PROGRESS NOTES
Patient called to schedule Covid testing.  Spoke with Veronica, related patients inability to speak (per Arnulfo).  Patient will come this morning at 11:00 for covid testing related to appointment coming up on 08/20.

## 2020-08-18 NOTE — PROGRESS NOTES
Attempted to call patient related to needing covid testing for upcoming biopsy on 08/20.  Immediately went to [facility], requested a return call.

## 2020-08-18 NOTE — PROGRESS NOTES
Message left on answering machine to return our call ([phone]).  Patient did not have COVID testing done yesterday, called to request that she come for it this morning.

## 2020-08-18 NOTE — PROGRESS NOTES
"Thalia, patients caregiver, returned call about 1245 this afternoon.  She said that she has help to get Nilda to the COVID testing this afternoon, \"because she was too weak yesterday to get her to come here\"  Explained that the COVID testing needed to go out at least 48 hours prior to the procedure and we have missed that time frame.  Patient angry regarding this.  Explained that I could call Dr Bowling who can waive the COVID testing if she deems the procedure immergant.  Contacted Dr Bowling whom directs us to follow our protocol which requires CT biopsies to have COVID testing.  She feels the biopsy is urgent but not immergant.  Updated Thalia of this and provided date of September 3 with covid testing on Sunday august 30 at 1000.   "

## 2020-08-26 NOTE — TELEPHONE ENCOUNTER
3:57 PM    Reason for Call: Pain Medication     Description: Call from Thalia reporting increase in head pain due to brain CA. Requesting rx for pain medication (Percocet)    Pharmacy: [facility].     Was an appointment offered for this call? No  If yes : Appointment type              Date    Preferred method for responding to this message: Telephone Call  What is your phone number - [phone] (Thalia)    If we cannot reach you directly, may we leave a detailed response at the number you provided? Yes    Can this message wait until your PCP/provider returns, if available today? YES, No    Janis Bradford RN

## 2020-09-03 NOTE — PROGRESS NOTES
Procedure: soft tissue neck mass biopsy, right    There were  no complications and patient has no symptoms..      Tolerated procedure well.    Radiologist:Dr Burrell    Time Out: Prior to the start of the procedure and with procedural staff participation, I verbally confirmed the patient s identity using two indicators, relevant allergies, that the procedure was appropriate and matched the consent or emergent situation, and that the correct equipment/implants were available. Immediately prior to starting the procedure I conducted the Time Out with the procedural staff and re-confirmed the patient s name, procedure, and site/side. (The Joint Commission universal protocol was followed.)  Yes    Position:  prone    Pain:  0    Sedation:None. Local Anesthestic used  No sedation    Estimated Blood Loss: Minimal     Condition: Stable    Comments: See dictated procedure note for full details     ARACELI FARRIS RN

## 2020-09-03 NOTE — DISCHARGE INSTRUCTIONS
"    DISCHARGE INSTRUCTIONS  Needle Aspiration      IMPORTANT: As you prepare for discharge, the following information will help you return to your best level of health.               This Information Is About Your Follow Up Care     Call your doctor if you do not get better. Call sooner if you feel worse. You can reach your doctor by calling their clinic phone number.                                    This Information Is About Procedures      NEEDLE ASPIRATION  You have had a needle aspiration.  A needle aspiration (also called a \"fine needle biopsy\") is a procedure used to take a sample of cells or tissues from a lump or mass or other area of concern.  The sample of cells taken during the procedure can then be checked in the lab under a microscope to find out if there is cancer or other diseases.   Sometimes a needle aspiration is done to check the effect of treatment on a known tumor.    When you go home, follow these instructions:    Check the site frequently for bleeding, swelling, redness, or drainage.    Use an ice pack at the site for 20 minutes at a time if needed for pain.    Rest for the remainder of the day.    Don't drive for 24 hours if you received medicine to relax you during the procedure.    Return to your usual diet.    Do not take aspirin or anti-inflammatory medicines like ibuprofen or naproxen (check with your doctor if you take aspirin for heart disease or stroke).    You may take acetaminophen (Tylenol) for pain if needed.    Do not take blood thinner medicines until your doctor tells you to restart them.    Call your doctor if you have:    Continued bleeding    Swelling or a lump at the needle site    Pain not made better by acetaminophen    Fever or chills    Redness or drainage from the needle site    Chest pain or trouble breathing  Any questions or concerns                      "

## 2020-09-03 NOTE — PROGRESS NOTES
Vitals stable , 98 % RA. Procedure site CDI with gauze applied . Denies pain or discomfort. Still numbness and weakness to right face. Will follow up with her tomorrow .

## 2020-09-03 NOTE — IP AVS SNAPSHOT
"                  MRN:[id number]                      After Visit Summary   9/3/2020    Nilda Ramirez    MRN: [id number]           Visit Information        Provider Department      9/3/2020  9:00 AM HIXRRN; HIIRRAD; HICT1 HI CT SCAN           Review of your medicines      Notice    This visit is during an admission. Changes to the med list made in this visit will be reflected in the After Visit Summary of the admission.           Protect others around you: Learn how to safely use, store and throw away your medicines at www.disposemymeds.org.       Follow-ups after your visit       Care Instructions       Further instructions from your care team           DISCHARGE INSTRUCTIONS  Needle Aspiration      IMPORTANT: As you prepare for discharge, the following information will help you return to your best level of health.               This Information Is About Your Follow Up Care     Call your doctor if you do not get better. Call sooner if you feel worse. You can reach your doctor by calling their clinic phone number.                                    This Information Is About Procedures      NEEDLE ASPIRATION  You have had a needle aspiration.  A needle aspiration (also called a \"fine needle biopsy\") is a procedure used to take a sample of cells or tissues from a lump or mass or other area of concern.  The sample of cells taken during the procedure can then be checked in the lab under a microscope to find out if there is cancer or other diseases.   Sometimes a needle aspiration is done to check the effect of treatment on a known tumor.    When you go home, follow these instructions:    Check the site frequently for bleeding, swelling, redness, or drainage.    Use an ice pack at the site for 20 minutes at a time if needed for pain.    Rest for the remainder of the day.    Don't drive for 24 hours if you received medicine to relax you during the procedure.    Return to your usual diet.    Do not take aspirin or " "anti-inflammatory medicines like ibuprofen or naproxen (check with your doctor if you take aspirin for heart disease or stroke).    You may take acetaminophen (Tylenol) for pain if needed.    Do not take blood thinner medicines until your doctor tells you to restart them.    Call your doctor if you have:    Continued bleeding    Swelling or a lump at the needle site    Pain not made better by acetaminophen    Fever or chills    Redness or drainage from the needle site    Chest pain or trouble breathing  Any questions or concerns                        Additional Information About Your Visit       [employer] Information    My[employer] lets you send messages to your doctor, view your test results, renew your prescriptions, schedule appointments and more. To sign up, go to [email or web address] . Click on \"Log in\" on the left side of the screen, which will take you to the Welcome page. Then click on \"Sign up Now\" on the right side of the page.     You will be asked to enter the access code listed below, as well as some personal information. Please follow the directions to create your username and password.     Your access code is: [email or web address]  Expires: 10/2/2020  5:14 PM     Your access code will  in 60 days. If you need help or a new code, please call your   [facility] clinic or [phone].       Care EveryWhere ID    This is your Care EveryWhere ID. This could be used by other organizations to access your [facility] medical records  [id number]       Your Vitals Were  Most recent update: 9/3/2020  8:49 AM    Blood Pressure   128/88    Pulse   85    Temperature   97.6  F (36.4  C) (Temporal)    Respirations   16    Pulse Oximetry   96%          Primary Care Provider Office Phone # Fax #    Jennifer Bowling [phone]      Equal Access to Services    MCKAYLA RPESLEY AH: Kayla Wyatt, sabrina florentino, eber maza, ashleigh ireland. " So [facility] [phone].    ATENCIÓN: Si habla tatiana, tiene a felder disposición servicios gratuitos de asistencia lingüística. Vikas al [phone].    We comply with applicable federal and state civil rights laws, including the [place] Human Rights Act. We do not discriminate on the basis of race, color, creed, Mosque, national origin, marital status, age, disability, sex, sexual orientation, or gender identity.       Thank you!    Thank you for choosing [facility] for your care. Our goal is always to provide you with excellent care. Hearing back from our patients is one way we can continue to improve our services. Please take a few minutes to complete the written survey that you may receive in the mail after you visit with us. Thank you!         Medication List     Notice    This visit is during an admission. Changes to the med list made in this visit will be reflected in the After Visit Summary of the admission.

## 2020-09-03 NOTE — IP AVS SNAPSHOT
HI CT SCAN  [address] [address]  Phone:  [phone]  Fax:  [phone]                                    After Visit Summary   9/3/2020    Nilda Ramirez    MRN: [id number]           After Visit Summary Signature Page    I have received my discharge instructions, and my questions have been answered. I have discussed any challenges I see with this plan with the nurse or doctor.    ..........................................................................................................................................  Patient/Patient Representative Signature      ..........................................................................................................................................  Patient Representative Print Name and Relationship to Patient    ..................................................               ................................................  Date                                   Time    ..........................................................................................................................................  Reviewed by Signature/Title    ...................................................              ..............................................  Date                                               Time          22EPIC Rev 08/18

## 2020-09-03 NOTE — PROGRESS NOTES
Discharge instructions given and understood . Al questions answered to the  The best of the nurses Knowledge . Will escort patient by w/ch to exit. Friend Thalia will drive her home and stay with her.

## 2020-09-04 NOTE — TELEPHONE ENCOUNTER
Next day post right neck mass bx fkollow up. No answer-left message and number to call if any questions or concerns. Will send a post card

## 2020-09-08 NOTE — TELEPHONE ENCOUNTER
Patient calling back again. States she just spoke to nurse Jeniffer and had a few more questions. She would like a call back. Ashley LeChevalier LPN

## 2020-09-09 NOTE — PROGRESS NOTES
Clinic Care Coordination Contact  Care Team Conversations    Left Voicemail with patient regarding recent referral. Informed that we received referral and will review with oncologist. Encouraged to call with any questions or concerns.  Jennifer Maria RN Oncology Care Coordinator

## 2020-09-10 NOTE — TELEPHONE ENCOUNTER
Please call the patient regarding oncology appointment that is supposed to be set up for her. [phone]

## 2020-09-11 NOTE — TELEPHONE ENCOUNTER
Patient calling for biopsy results from 9/3/2020. States still in process. She stated she was told that the results would be back within 48 hours. She would like a call back once results are in. Ashley LeChevalier LPN

## 2020-09-15 NOTE — TELEPHONE ENCOUNTER
I'm not sure why the patient was told this would be available after 48 hours, this often takes a week, sometimes two if they need to send it to the pathologists in the [facility] for consultation.

## 2020-09-15 NOTE — TELEPHONE ENCOUNTER
Patient calling again regarding results. Informed her they are still in process and of [facility] note that it could take up to 2 weeks for results to return. Informed patient someone will be in touch with her once the results have come back. She stated understanding. Ashley LeChevalier LPN

## 2020-09-16 NOTE — PROGRESS NOTES
Clinic Care Coordination Contact  Care Team Conversations    Spoke with patient and informed her that we received referral. Gave her appointment time, and informed her that we are still waiting pathology.  Jennifer Maria RN Oncology Care Coordinator
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
## 2020-09-18 NOTE — TELEPHONE ENCOUNTER
Pt called again for results of biopsy. Again informed pt that results are not back yet but we will call as soon as results are received.

## 2020-09-22 NOTE — NURSING NOTE
"Chief Complaint   Patient presents with     Consult     glomus tumor of base of skull, referral by Dr Bowling       Initial /78   Pulse 108   Temp 98.9  F (37.2  C) (Tympanic)   Ht 1.651 m (5' 5\")   Wt 52 kg (114 lb 10.2 oz)   SpO2 97%   BMI 19.08 kg/m   Estimated body mass index is 19.08 kg/m  as calculated from the following:    Height as of this encounter: 1.651 m (5' 5\").    Weight as of this encounter: 52 kg (114 lb 10.2 oz).  Medication Reconciliation: complete     Immunizations and advance directives status reviewed. Pain scale 10 , PHQ-9 unable to complete; score of 7 after 3 questions.      Patient was assessed using the NCCN psychosocial distress thermometer. Patient rated the score as a unable to finish. Patient rated current stressors as pain. Stressors will be brought to the attention of provider or Oncology RN Care Coordinator for a score of 6 or greater or per nurses discretion.                 Melisa Negron LPN  "

## 2020-09-22 NOTE — PATIENT INSTRUCTIONS
We discussed your options today for Dx of Diffuse B- Cell Lymphoma:    Dr. Horton recommends chemotherapy as your best option for treatment, with a 60 % response rate.  If you choose this option you will be treated with Rituxan, Cytoxin, Adramycin and prednisone  You would need a PET scan, Brain MRI, and Echo cardiogram prior to treatment.  If choose treatment we would like you to have a PORT A Cath placed to administer your medications.    He would also recommend radiation therapy to help with the pain. This will not cure your disease.     When you make your decision please call [phone]    Call Good to arrange transportation [phone]

## 2020-09-22 NOTE — PROGRESS NOTES
Clinic Care Coordination Contact    Clinic Care Coordination Contact  OUTREACH    Met patient and her caregiver in the clinic today before their appointment with Dr. Lopez.  Patient is Hard of Hearing so the best communication tool is writing on a caldwell of paper.  Patient is a former nurse.  She comes to her appointment today having done her research and expresses that she has a good understanding about what is going on.  She does come into the appointment in a wheelchair.  She does require assistance moving from wheelchair to chair.  1 person to assist with sit-stand-sit transfer.    Patient lives in [place] and sees Dr. Mendez in [facility].  She does have transportation available to her through her [facility] insurance plan.  She did not know that.  The MD saw her and has given her some options to think about regarding treatment.  She will think about them and call when she decides what she would like to do.      Medical transport will have to go in and assist patient in getting out of house and into vehicle.    If treatment and further diagnostic tests are needed, this SW will set up transport for her.  RN Coordinator knows to call this SW to get it started.    Total Time:  1 hour+

## 2020-09-23 NOTE — TELEPHONE ENCOUNTER
The registered nurse with home care is asking/requesting a call regarding the current status and care of patient. Please contact him at [phone].
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
## 2020-09-23 NOTE — TELEPHONE ENCOUNTER
Spoke with nurse, he will be seeing the patient tomorrow.  Oncology notes reviewed, case discussed.

## 2020-09-24 NOTE — TELEPHONE ENCOUNTER
11:18 AM    Reason for Call: Hospice and Personal Care Order     Description: Call from pt's friend requesting an order for Hospice and Personal Care Services. PCA Services x 5 days per week.     Pt Dx with CA, has chosen to not have Chemo.        Was an appointment offered for this call? No  If yes : Appointment type              Date    Preferred method for responding to this message: Order to Hospice and Home Care   What is your phone number ?    If we cannot reach you directly, may we leave a detailed response at the number you provided? na    Can this message wait until your PCP/provider returns, if available today?    Not applicable        Janis Bradford RN

## 2020-09-25 NOTE — PROGRESS NOTES
Clinic Care Coordination Contact  Care Team Conversations    TC from patients caregiver regarding wanting a referral for radiation oncology. Patient has chosen to hold off on hospice for the time being, but would like radiation.  Jennifer Maria RN Oncology Care Coordinator

## 2020-09-28 NOTE — PROGRESS NOTES
Care Transitions focused note:      Got the special transportation form sent over to Nilda Levy's care coordinator, can assist in rides if patient is going to do radiation.

## 2020-10-01 NOTE — PROGRESS NOTES
Clinic Care Coordination Contact  Care Team Conversations    Spoke with  with Radiation oncology to inquire about referral placed. She reports that patient decided that she did not want treatment, but knows to call if she changes her mind.  Jennifer Maria RN Oncology Care Coordinator

## 2020-10-02 PROBLEM — Z51.5 HOSPICE CARE PATIENT: Status: ACTIVE | Noted: 2020-01-01

## 2020-10-02 NOTE — PROGRESS NOTES
PHYSICIAN CERTIFICATION OF TERMINAL ILLNESS FOR HOSPICE BENEFIT    October 2, 2020    Nilda Ramirez    1950      Effective Date of Certification    Start Date:  10/2/2020      End Date:  12/30/2020    Terminal Diagnosis:        Diffuse Large-B Cell Lymphoma    Secondary Diagnoses:     Regional lymph node metastases     Distal lymph node metastases     Left renal metastases, presumed      Prognosis Related Comorbidities:    Diabetes mellitus, type 2     Hypertension       Narrative Statement:  Client suffers from metastatic diffuse large B cell lymphoma. This involves the skull, kidney, as well as local and distal lymph nodes.  This is complicated by compressive symptoms including diplopia and previous hearing loss . She is not undergoing disease directed therapy.  She continues to decline. Client resides at home and requires assistance with ADL's.  Goals of symptom control will be met.  Because of the progressive nature of the illness, anticipated disease trajectory, as well as associated comorbidities, client qualifies for hospice care.             I certify that this patient is terminally ill and has a life expectancy of 6 months or less if the terminal illness runs its anticipated course.        Attestation:  I confirm that this narrative was composed by myself and is based on review of the medical record and/or examination of the patient.            Ramiro Bergman MD

## 2023-07-27 NOTE — TELEPHONE ENCOUNTER
Pt actually did not want you to leave message if she doesn't answer as she would like to speak with nurse   Sleep apnea, history of CPAP failure  Discussed Inspire device  Referral to sleep disorder

## 2023-09-29 NOTE — PROGRESS NOTES
Vss, 97 % via RA. Noted right facial droop after procedure . Appearance odf facial stroke . Called Doctor Indra . He stated it is from the lidocaine injection at the procedure site. . Patient procedure sit scant blood noted and some swelling. . Cleaned and put 4x4 and ice to site.    All other review of systems negative, except as noted in HPI

## 2025-04-14 NOTE — TELEPHONE ENCOUNTER
Left message for return call.   Pt no call / no showed for scheduled PT appointment.   Spoke with patient regarding time and date of next scheduled PT appointment. She confirmed this will work for her.

## (undated) DEVICE — Device

## (undated) DEVICE — SYRINGE-20CC LUER LOCK

## (undated) DEVICE — BLANKET-BAIR UPPER BODY

## (undated) DEVICE — SUTURE-VICRYL 0 CT-2 J270H

## (undated) DEVICE — SUTURE-ETHILON 2-0 PSLX 1697H

## (undated) DEVICE — DRAIN-JP 10MM FLAT W/TROCAR

## (undated) DEVICE — TUBING-SUCTION 20FT

## (undated) DEVICE — DRAIN-JP BULB RESERVOIR 100CC

## (undated) DEVICE — TUBE-SALEM SUMP 18FR STOMACH SUCTION

## (undated) DEVICE — TOPICAL SKIN ADHESIVE EXOFIN

## (undated) DEVICE — GLV-7.0 PROTEXIS PI CLASSIC LF/PF

## (undated) DEVICE — INZII RETRIEVAL SYSTEM-10MM

## (undated) DEVICE — TROCAR-11X100MM BLADED W/FIXATION

## (undated) DEVICE — CANISTER-SUCTION 2000CC

## (undated) DEVICE — EXTENSION SET-BAXTER 20

## (undated) DEVICE — LABEL-STERILE PREPRINTED FOR OR

## (undated) DEVICE — PACK-LAPAROSCOPY-CUSTOM

## (undated) DEVICE — IRRIGATION-NACL 1000ML

## (undated) DEVICE — SCISSOR-ENDOPATH 5MM CURVED

## (undated) DEVICE — SUCTION-IRRIGATION STRYKEFLOW II (STRYKER)

## (undated) DEVICE — APPLICATOR-CHLORAPREP 26ML TINTED CHG 2%+ 70% IPA-SURGICAL

## (undated) DEVICE — IRRIGATION-H2O 1000ML

## (undated) DEVICE — TROCAR-5X100MM BLADED W/FIXATION

## (undated) DEVICE — BLADE-SCALPEL #15

## (undated) DEVICE — DRAPE-C-ARM

## (undated) DEVICE — SUTURE-VICRYL 3-0 SH J416H

## (undated) DEVICE — LIGHT HANDLE COVER

## (undated) DEVICE — TROCAR SYSTEM-BLUNT TIP 12X100MM KII BALLOON

## (undated) DEVICE — BDG-STAT STRIP

## (undated) DEVICE — PUNCTURE CLOSURE DEVICE

## (undated) DEVICE — SCD SLEEVE-KNEE REG.

## (undated) DEVICE — WANDS-INSULSCAN

## (undated) DEVICE — SPATULA TIP FOR SUCTION IRRIGATION PROBE

## (undated) DEVICE — DRSG-ISLAND 4IN X 5IN

## (undated) DEVICE — CLIP APPLIER-LIGACLIP 10MM MEDIUM/LARGE

## (undated) DEVICE — CAUTERY PAD-POLYHESIVE II ADULT

## (undated) DEVICE — NDL-BLUNT FILL 18G 1.5"

## (undated) DEVICE — SOL-NACL 0.9% 1000ML

## (undated) DEVICE — DRSG-SPONGE STERILE 4 X 4

## (undated) DEVICE — SUTURE-PDS 0 CTB-1 ZB346

## (undated) DEVICE — DRAPE-UTILITY TOWEL 15X26"

## (undated) RX ORDER — NEOSTIGMINE METHYLSULFATE 5 MG/5 ML
SYRINGE (ML) INTRAVENOUS
Status: DISPENSED
Start: 2017-08-07

## (undated) RX ORDER — FENTANYL CITRATE 50 UG/ML
INJECTION, SOLUTION INTRAMUSCULAR; INTRAVENOUS
Status: DISPENSED
Start: 2017-08-07

## (undated) RX ORDER — LIDOCAINE HYDROCHLORIDE 20 MG/ML
INJECTION, SOLUTION EPIDURAL; INFILTRATION; INTRACAUDAL; PERINEURAL
Status: DISPENSED
Start: 2017-08-07

## (undated) RX ORDER — PROPOFOL 10 MG/ML
INJECTION, EMULSION INTRAVENOUS
Status: DISPENSED
Start: 2017-08-07

## (undated) RX ORDER — GLYCOPYRROLATE 0.2 MG/ML
INJECTION, SOLUTION INTRAMUSCULAR; INTRAVENOUS
Status: DISPENSED
Start: 2017-08-07

## (undated) RX ORDER — EPHEDRINE SULFATE 50 MG/ML
INJECTION, SOLUTION INTRAMUSCULAR; INTRAVENOUS; SUBCUTANEOUS
Status: DISPENSED
Start: 2017-08-07

## (undated) RX ORDER — DEXAMETHASONE SODIUM PHOSPHATE 10 MG/ML
INJECTION, SOLUTION INTRAMUSCULAR; INTRAVENOUS
Status: DISPENSED
Start: 2017-08-07

## (undated) RX ORDER — PHENYLEPHRINE HCL IN 0.9% NACL 1 MG/10 ML
SYRINGE (ML) INTRAVENOUS
Status: DISPENSED
Start: 2017-08-07

## (undated) RX ORDER — ONDANSETRON 2 MG/ML
INJECTION INTRAMUSCULAR; INTRAVENOUS
Status: DISPENSED
Start: 2017-08-07